# Patient Record
Sex: FEMALE | Race: WHITE | NOT HISPANIC OR LATINO
[De-identification: names, ages, dates, MRNs, and addresses within clinical notes are randomized per-mention and may not be internally consistent; named-entity substitution may affect disease eponyms.]

---

## 2021-09-17 ENCOUNTER — APPOINTMENT (OUTPATIENT)
Dept: OPHTHALMOLOGY | Facility: CLINIC | Age: 76
End: 2021-09-17

## 2024-02-15 ENCOUNTER — RESULT REVIEW (OUTPATIENT)
Age: 79
End: 2024-02-15

## 2024-02-15 PROBLEM — Z00.00 ENCOUNTER FOR PREVENTIVE HEALTH EXAMINATION: Status: ACTIVE | Noted: 2024-02-15

## 2024-02-17 ENCOUNTER — TRANSCRIPTION ENCOUNTER (OUTPATIENT)
Age: 79
End: 2024-02-17

## 2024-02-17 ENCOUNTER — INPATIENT (INPATIENT)
Facility: HOSPITAL | Age: 79
LOS: 4 days | Discharge: HOSPICE | DRG: 64 | End: 2024-02-22
Attending: HOSPITALIST | Admitting: STUDENT IN AN ORGANIZED HEALTH CARE EDUCATION/TRAINING PROGRAM
Payer: MEDICARE

## 2024-02-17 VITALS
OXYGEN SATURATION: 98 % | HEART RATE: 108 BPM | SYSTOLIC BLOOD PRESSURE: 122 MMHG | TEMPERATURE: 100 F | DIASTOLIC BLOOD PRESSURE: 90 MMHG | WEIGHT: 86.42 LBS | RESPIRATION RATE: 18 BRPM

## 2024-02-17 DIAGNOSIS — Z98.890 OTHER SPECIFIED POSTPROCEDURAL STATES: Chronic | ICD-10-CM

## 2024-02-17 DIAGNOSIS — G44 OTHER HEADACHE SYNDROMES: ICD-10-CM

## 2024-02-17 DIAGNOSIS — R09.89 OTHER SPECIFIED SYMPTOMS AND SIGNS INVOLVING THE CIRCULATORY AND RESPIRATORY SYSTEMS: ICD-10-CM

## 2024-02-17 LAB
A1C WITH ESTIMATED AVERAGE GLUCOSE RESULT: 5.6 % — SIGNIFICANT CHANGE UP (ref 4–5.6)
ALBUMIN SERPL ELPH-MCNC: 3 G/DL — LOW (ref 3.3–5.2)
ALP SERPL-CCNC: 56 U/L — SIGNIFICANT CHANGE UP (ref 40–120)
ALT FLD-CCNC: 14 U/L — SIGNIFICANT CHANGE UP
ANION GAP SERPL CALC-SCNC: 14 MMOL/L — SIGNIFICANT CHANGE UP (ref 5–17)
APPEARANCE UR: CLEAR — SIGNIFICANT CHANGE UP
AST SERPL-CCNC: 28 U/L — SIGNIFICANT CHANGE UP
BACTERIA # UR AUTO: NEGATIVE /HPF — SIGNIFICANT CHANGE UP
BILIRUB DIRECT SERPL-MCNC: 0.2 MG/DL — SIGNIFICANT CHANGE UP (ref 0–0.3)
BILIRUB INDIRECT FLD-MCNC: 1 MG/DL — SIGNIFICANT CHANGE UP (ref 0.2–1)
BILIRUB SERPL-MCNC: 1.3 MG/DL — SIGNIFICANT CHANGE UP (ref 0.4–2)
BILIRUB UR-MCNC: NEGATIVE — SIGNIFICANT CHANGE UP
BLD GP AB SCN SERPL QL: SIGNIFICANT CHANGE UP
BUN SERPL-MCNC: 28.5 MG/DL — HIGH (ref 8–20)
CALCIUM SERPL-MCNC: 8.4 MG/DL — SIGNIFICANT CHANGE UP (ref 8.4–10.5)
CAST: 2 /LPF — SIGNIFICANT CHANGE UP (ref 0–4)
CHLORIDE SERPL-SCNC: 104 MMOL/L — SIGNIFICANT CHANGE UP (ref 96–108)
CO2 SERPL-SCNC: 23 MMOL/L — SIGNIFICANT CHANGE UP (ref 22–29)
COLOR SPEC: SIGNIFICANT CHANGE UP
CREAT SERPL-MCNC: 0.48 MG/DL — LOW (ref 0.5–1.3)
DIFF PNL FLD: NEGATIVE — SIGNIFICANT CHANGE UP
EGFR: 96 ML/MIN/1.73M2 — SIGNIFICANT CHANGE UP
ESTIMATED AVERAGE GLUCOSE: 114 MG/DL — SIGNIFICANT CHANGE UP (ref 68–114)
GLUCOSE SERPL-MCNC: 210 MG/DL — HIGH (ref 70–99)
GLUCOSE UR QL: 250 MG/DL
HCT VFR BLD CALC: 27.2 % — LOW (ref 34.5–45)
HGB BLD-MCNC: 9.6 G/DL — LOW (ref 11.5–15.5)
INR BLD: 1.17 RATIO — SIGNIFICANT CHANGE UP (ref 0.85–1.18)
KETONES UR-MCNC: 40 MG/DL
LEUKOCYTE ESTERASE UR-ACNC: ABNORMAL
MCHC RBC-ENTMCNC: 33 PG — SIGNIFICANT CHANGE UP (ref 27–34)
MCHC RBC-ENTMCNC: 35.3 GM/DL — SIGNIFICANT CHANGE UP (ref 32–36)
MCV RBC AUTO: 93.5 FL — SIGNIFICANT CHANGE UP (ref 80–100)
NITRITE UR-MCNC: NEGATIVE — SIGNIFICANT CHANGE UP
PH UR: 6 — SIGNIFICANT CHANGE UP (ref 5–8)
PLATELET # BLD AUTO: 216 K/UL — SIGNIFICANT CHANGE UP (ref 150–400)
POTASSIUM SERPL-MCNC: 3.8 MMOL/L — SIGNIFICANT CHANGE UP (ref 3.5–5.3)
POTASSIUM SERPL-SCNC: 3.8 MMOL/L — SIGNIFICANT CHANGE UP (ref 3.5–5.3)
PROT SERPL-MCNC: 5.5 G/DL — LOW (ref 6.6–8.7)
PROT UR-MCNC: 30 MG/DL
PROTHROM AB SERPL-ACNC: 12.9 SEC — SIGNIFICANT CHANGE UP (ref 9.5–13)
RBC # BLD: 2.91 M/UL — LOW (ref 3.8–5.2)
RBC # FLD: 12.5 % — SIGNIFICANT CHANGE UP (ref 10.3–14.5)
RBC CASTS # UR COMP ASSIST: 6 /HPF — HIGH (ref 0–4)
SODIUM SERPL-SCNC: 141 MMOL/L — SIGNIFICANT CHANGE UP (ref 135–145)
SP GR SPEC: 1.03 — SIGNIFICANT CHANGE UP (ref 1–1.03)
SQUAMOUS # UR AUTO: 3 /HPF — SIGNIFICANT CHANGE UP (ref 0–5)
TSH SERPL-MCNC: 0.9 UIU/ML — SIGNIFICANT CHANGE UP (ref 0.27–4.2)
UROBILINOGEN FLD QL: 1 MG/DL — SIGNIFICANT CHANGE UP (ref 0.2–1)
WBC # BLD: 11.93 K/UL — HIGH (ref 3.8–10.5)
WBC # FLD AUTO: 11.93 K/UL — HIGH (ref 3.8–10.5)
WBC UR QL: 8 /HPF — HIGH (ref 0–5)

## 2024-02-17 PROCEDURE — 71045 X-RAY EXAM CHEST 1 VIEW: CPT | Mod: 26

## 2024-02-17 PROCEDURE — 95720 EEG PHY/QHP EA INCR W/VEEG: CPT

## 2024-02-17 PROCEDURE — 93970 EXTREMITY STUDY: CPT | Mod: 26

## 2024-02-17 PROCEDURE — 99291 CRITICAL CARE FIRST HOUR: CPT

## 2024-02-17 RX ORDER — LEVETIRACETAM 250 MG/1
500 TABLET, FILM COATED ORAL EVERY 12 HOURS
Refills: 0 | Status: DISCONTINUED | OUTPATIENT
Start: 2024-02-17 | End: 2024-02-17

## 2024-02-17 RX ORDER — SENNA PLUS 8.6 MG/1
2 TABLET ORAL AT BEDTIME
Refills: 0 | Status: DISCONTINUED | OUTPATIENT
Start: 2024-02-17 | End: 2024-02-20

## 2024-02-17 RX ORDER — POLYETHYLENE GLYCOL 3350 17 G/17G
17 POWDER, FOR SOLUTION ORAL EVERY 12 HOURS
Refills: 0 | Status: DISCONTINUED | OUTPATIENT
Start: 2024-02-17 | End: 2024-02-20

## 2024-02-17 RX ORDER — HEPARIN SODIUM 5000 [USP'U]/ML
5000 INJECTION INTRAVENOUS; SUBCUTANEOUS EVERY 8 HOURS
Refills: 0 | Status: DISCONTINUED | OUTPATIENT
Start: 2024-02-17 | End: 2024-02-20

## 2024-02-17 RX ORDER — SODIUM CHLORIDE 9 MG/ML
1000 INJECTION, SOLUTION INTRAVENOUS
Refills: 0 | Status: DISCONTINUED | OUTPATIENT
Start: 2024-02-17 | End: 2024-02-19

## 2024-02-17 RX ORDER — ATORVASTATIN CALCIUM 80 MG/1
40 TABLET, FILM COATED ORAL AT BEDTIME
Refills: 0 | Status: DISCONTINUED | OUTPATIENT
Start: 2024-02-17 | End: 2024-02-20

## 2024-02-17 RX ORDER — CHLORHEXIDINE GLUCONATE 213 G/1000ML
15 SOLUTION TOPICAL EVERY 12 HOURS
Refills: 0 | Status: DISCONTINUED | OUTPATIENT
Start: 2024-02-17 | End: 2024-02-18

## 2024-02-17 RX ORDER — PANTOPRAZOLE SODIUM 20 MG/1
40 TABLET, DELAYED RELEASE ORAL DAILY
Refills: 0 | Status: DISCONTINUED | OUTPATIENT
Start: 2024-02-17 | End: 2024-02-22

## 2024-02-17 RX ORDER — LEVETIRACETAM 250 MG/1
500 TABLET, FILM COATED ORAL EVERY 12 HOURS
Refills: 0 | Status: DISCONTINUED | OUTPATIENT
Start: 2024-02-17 | End: 2024-02-18

## 2024-02-17 RX ORDER — ACETAMINOPHEN 500 MG
650 TABLET ORAL EVERY 6 HOURS
Refills: 0 | Status: DISCONTINUED | OUTPATIENT
Start: 2024-02-17 | End: 2024-02-20

## 2024-02-17 RX ORDER — ASPIRIN/CALCIUM CARB/MAGNESIUM 324 MG
81 TABLET ORAL DAILY
Refills: 0 | Status: DISCONTINUED | OUTPATIENT
Start: 2024-02-17 | End: 2024-02-20

## 2024-02-17 RX ADMIN — SODIUM CHLORIDE 75 MILLILITER(S): 9 INJECTION, SOLUTION INTRAVENOUS at 17:41

## 2024-02-17 RX ADMIN — LEVETIRACETAM 400 MILLIGRAM(S): 250 TABLET, FILM COATED ORAL at 17:30

## 2024-02-17 RX ADMIN — PANTOPRAZOLE SODIUM 40 MILLIGRAM(S): 20 TABLET, DELAYED RELEASE ORAL at 22:09

## 2024-02-17 RX ADMIN — Medication 650 MILLIGRAM(S): at 22:15

## 2024-02-17 RX ADMIN — SENNA PLUS 2 TABLET(S): 8.6 TABLET ORAL at 22:08

## 2024-02-17 RX ADMIN — CHLORHEXIDINE GLUCONATE 15 MILLILITER(S): 213 SOLUTION TOPICAL at 17:52

## 2024-02-17 RX ADMIN — POLYETHYLENE GLYCOL 3350 17 GRAM(S): 17 POWDER, FOR SOLUTION ORAL at 17:30

## 2024-02-17 RX ADMIN — ATORVASTATIN CALCIUM 40 MILLIGRAM(S): 80 TABLET, FILM COATED ORAL at 22:07

## 2024-02-17 RX ADMIN — Medication 81 MILLIGRAM(S): at 22:09

## 2024-02-17 RX ADMIN — HEPARIN SODIUM 5000 UNIT(S): 5000 INJECTION INTRAVENOUS; SUBCUTANEOUS at 22:07

## 2024-02-17 NOTE — CHART NOTE - NSCHARTNOTEFT_GEN_A_CORE
78 year old female, otherwise independent and functional with a prior history of stroke 7 years ago, left-sided   craniotomy for an abscess 3 years ago, seizures (on Keppra since 2021) presented the emergency room on   2/13/2024 after having being found on the floor after approx 10 hours. At the time, she was confused, had impaired   speech and reportedly somewhat hypoxic with PO2 of 72. She underwent a full trauma workup with unremarkable   CT brain and CT angiogram of the brain. However she was found to have R IT hip fracture, which was repaired on 2/14.     On 2/15 am she was found to be unresponsive and has since remained unresponsive with episodes of   hypoxia. MRI of the brain which showed extensive small infarcts in watershed and posterior vascular territory   distribution. Palliative care discussed with family and she was initially made DNR/DNI and comfort care.     After review of the case by Dr. Gaitan and Dr. Shipley, it was felt that the infarcts alone were not extensive enough to   cause the depressed level of consciousness. Since patient is at increased risk of seizures due to her previous   insult and with the most recent infarcts as well, it was agreed upon by the multidisciplinary team and family that the   possibility of non-convulsive status should at least be ruled out.     Family revoked comfort care status and opted for DNR with a trial of intubation. Patient was upgraded back to ICU after receiving dose of keppra and versed. Given   her tenuous respiratory status and state of unresponsiveness, patient was intubated for airway protection. She is accepted transfer to [facility] Neuro ICU under Dr. Rubio for VEEG monitoring

## 2024-02-17 NOTE — H&P ADULT - CRITICAL CARE ATTENDING COMMENT
I spent 60 minutes of critical care time examining patient, reviewing vitals, labs, medications, imaging and discussing with the team goals of care to prevent life-threatening in this patient who is at high risk for deterioration or death due to:      Pt seen and examined. Agree with above assessment and plan. I spent 60 minutes of critical care time examining patient, reviewing vitals, labs, medications, imaging and discussing with the team goals of care to prevent life-threatening in this patient who is at high risk for deterioration or death due to:      Pt seen and examined. Agree with above assessment and plan.    GCS 3, intubated  No EO  No FC  0/5 bilat upper  TF bilat lower ext

## 2024-02-17 NOTE — H&P ADULT - HISTORY OF PRESENT ILLNESS
78 year old female, otherwise independent and functional with a prior history of stroke 7 years ago, left-sided   craniotomy for an abscess 3 years ago, seizures (on Keppra since 2021) presented the emergency room on   2/13/2024 after having being found on the floor after approx 10 hours. At the time, she was confused, had impaired   speech and reportedly somewhat hypoxic with PO2 of 72. She underwent a full trauma workup with unremarkable   CT brain and CT angiogram of the brain. However she was found to have R IT hip fracture, which was repaired on 2/14.     On 2/15 am she was found to be unresponsive and has since remained unresponsive with episodes of   hypoxia. MRI of the brain which showed extensive small infarcts in watershed and posterior vascular territory   distribution. Palliative care discussed with family and she was initially made DNR/DNI and comfort care.     After review of the case by Dr. Gaitan and Dr. Shipley, it was felt that the infarcts alone were not extensive enough to   cause the depressed level of consciousness. Since patient is at increased risk of seizures due to her previous   insult and with the most recent infarcts as well, it was agreed upon by the multidisciplinary team and family that the   possibility of non-convulsive status should at least be ruled out.     Family revoked comfort care status and opted for DNR with a trial of intubation. Patient was upgraded back to ICU after receiving dose of keppra and versed. Given   her tenuous respiratory status and state of unresponsiveness, patient was intubated for airway protection. She is accepted transfer to [facility] Neuro ICU for eeg monitoring

## 2024-02-17 NOTE — PATIENT PROFILE ADULT - FALL HARM RISK - HARM RISK INTERVENTIONS

## 2024-02-17 NOTE — H&P ADULT - ASSESSMENT
78 year old female, with prior history of stroke 7 years ago, left-sided craniotomy for an abscess 3 years ago, seizures (on Keppra since 2021) presented the emergency room on   2/13/2024 after having being found on the floor after approx 10 hours.  Found to have hypoxia.  She underwent a full trauma workup with unremarkable   CT brain and CT angiogram of the brain. However she was found to have R IT hip fracture, which was repaired on 2/14.  On 2/15 am she was found to be unresponsive and has since remained unresponsive with episodes of   hypoxia. MRI of the brain which showed extensive small infarcts in watershed and posterior vascular territory  distribution. Palliative care discussed with family and she was initially made DNR/DNI and comfort care. Later reversed to r/o seizure and transferred to [facility] for veeg   - admit to NSCU q 1 neuro checks  - tylenol for pain control  - veeg monitoring  - continue keppra  - intubated: cxr  - infectious work up   - le dopp as high risk for DVT   - atorvastatin for hyperlipidemia

## 2024-02-18 LAB
ANION GAP SERPL CALC-SCNC: 9 MMOL/L — SIGNIFICANT CHANGE UP (ref 5–17)
BUN SERPL-MCNC: 28.9 MG/DL — HIGH (ref 8–20)
CALCIUM SERPL-MCNC: 8.1 MG/DL — LOW (ref 8.4–10.5)
CHLORIDE SERPL-SCNC: 106 MMOL/L — SIGNIFICANT CHANGE UP (ref 96–108)
CO2 SERPL-SCNC: 28 MMOL/L — SIGNIFICANT CHANGE UP (ref 22–29)
CREAT SERPL-MCNC: 0.49 MG/DL — LOW (ref 0.5–1.3)
CULTURE RESULTS: NO GROWTH — SIGNIFICANT CHANGE UP
EGFR: 95 ML/MIN/1.73M2 — SIGNIFICANT CHANGE UP
GLUCOSE SERPL-MCNC: 151 MG/DL — HIGH (ref 70–99)
HCT VFR BLD CALC: 25.3 % — LOW (ref 34.5–45)
HGB BLD-MCNC: 8.7 G/DL — LOW (ref 11.5–15.5)
MAGNESIUM SERPL-MCNC: 2.2 MG/DL — SIGNIFICANT CHANGE UP (ref 1.6–2.6)
MCHC RBC-ENTMCNC: 32.6 PG — SIGNIFICANT CHANGE UP (ref 27–34)
MCHC RBC-ENTMCNC: 34.4 GM/DL — SIGNIFICANT CHANGE UP (ref 32–36)
MCV RBC AUTO: 94.8 FL — SIGNIFICANT CHANGE UP (ref 80–100)
MRSA PCR RESULT.: SIGNIFICANT CHANGE UP
PHOSPHATE SERPL-MCNC: 2.5 MG/DL — SIGNIFICANT CHANGE UP (ref 2.4–4.7)
PLATELET # BLD AUTO: 179 K/UL — SIGNIFICANT CHANGE UP (ref 150–400)
POTASSIUM SERPL-MCNC: 4 MMOL/L — SIGNIFICANT CHANGE UP (ref 3.5–5.3)
POTASSIUM SERPL-SCNC: 4 MMOL/L — SIGNIFICANT CHANGE UP (ref 3.5–5.3)
RBC # BLD: 2.67 M/UL — LOW (ref 3.8–5.2)
RBC # FLD: 12.5 % — SIGNIFICANT CHANGE UP (ref 10.3–14.5)
S AUREUS DNA NOSE QL NAA+PROBE: SIGNIFICANT CHANGE UP
SODIUM SERPL-SCNC: 143 MMOL/L — SIGNIFICANT CHANGE UP (ref 135–145)
SPECIMEN SOURCE: SIGNIFICANT CHANGE UP
WBC # BLD: 9.18 K/UL — SIGNIFICANT CHANGE UP (ref 3.8–10.5)
WBC # FLD AUTO: 9.18 K/UL — SIGNIFICANT CHANGE UP (ref 3.8–10.5)

## 2024-02-18 PROCEDURE — 99291 CRITICAL CARE FIRST HOUR: CPT

## 2024-02-18 PROCEDURE — 99233 SBSQ HOSP IP/OBS HIGH 50: CPT

## 2024-02-18 RX ORDER — LEVETIRACETAM 250 MG/1
1000 TABLET, FILM COATED ORAL EVERY 12 HOURS
Refills: 0 | Status: DISCONTINUED | OUTPATIENT
Start: 2024-02-18 | End: 2024-02-22

## 2024-02-18 RX ORDER — LEVETIRACETAM 250 MG/1
1000 TABLET, FILM COATED ORAL
Refills: 0 | Status: DISCONTINUED | OUTPATIENT
Start: 2024-02-18 | End: 2024-02-18

## 2024-02-18 RX ADMIN — HEPARIN SODIUM 5000 UNIT(S): 5000 INJECTION INTRAVENOUS; SUBCUTANEOUS at 06:09

## 2024-02-18 RX ADMIN — Medication 63.75 MILLIMOLE(S): at 06:22

## 2024-02-18 RX ADMIN — PANTOPRAZOLE SODIUM 40 MILLIGRAM(S): 20 TABLET, DELAYED RELEASE ORAL at 11:03

## 2024-02-18 RX ADMIN — CHLORHEXIDINE GLUCONATE 15 MILLILITER(S): 213 SOLUTION TOPICAL at 06:09

## 2024-02-18 RX ADMIN — LEVETIRACETAM 1000 MILLIGRAM(S): 250 TABLET, FILM COATED ORAL at 17:59

## 2024-02-18 RX ADMIN — POLYETHYLENE GLYCOL 3350 17 GRAM(S): 17 POWDER, FOR SOLUTION ORAL at 06:09

## 2024-02-18 RX ADMIN — LEVETIRACETAM 400 MILLIGRAM(S): 250 TABLET, FILM COATED ORAL at 06:09

## 2024-02-18 RX ADMIN — ATORVASTATIN CALCIUM 40 MILLIGRAM(S): 80 TABLET, FILM COATED ORAL at 21:38

## 2024-02-18 RX ADMIN — HEPARIN SODIUM 5000 UNIT(S): 5000 INJECTION INTRAVENOUS; SUBCUTANEOUS at 14:39

## 2024-02-18 RX ADMIN — SODIUM CHLORIDE 75 MILLILITER(S): 9 INJECTION, SOLUTION INTRAVENOUS at 18:00

## 2024-02-18 RX ADMIN — HEPARIN SODIUM 5000 UNIT(S): 5000 INJECTION INTRAVENOUS; SUBCUTANEOUS at 21:37

## 2024-02-18 RX ADMIN — Medication 81 MILLIGRAM(S): at 11:03

## 2024-02-18 RX ADMIN — SODIUM CHLORIDE 75 MILLILITER(S): 9 INJECTION, SOLUTION INTRAVENOUS at 06:08

## 2024-02-18 NOTE — CHART NOTE - NSCHARTNOTEFT_GEN_A_CORE
78 year old female, otherwise independent and functional with a prior history of stroke 7 years ago, left-sided   craniotomy for an abscess 3 years ago, seizures (on Keppra since 2021) presented the emergency room on   2/13/2024 after having being found on the floor after approx 10 hours. At the time, she was confused, had impaired   speech and reportedly somewhat hypoxic with PO2 of 72. She underwent a full trauma workup with unremarkable   CT brain and CT angiogram of the brain. However she was found to have R IT hip fracture, which was repaired on 2/14.     On 2/15 am she was found to be unresponsive and has since remained unresponsive with episodes of   hypoxia. MRI of the brain which showed extensive small infarcts in watershed and posterior vascular territory   distribution. Palliative care discussed with family and she was initially made DNR/DNI and comfort care.     After review of the case by Dr. Gaitan and Dr. Shipley, it was felt that the infarcts alone were not extensive enough to   cause the depressed level of consciousness. Since patient is at increased risk of seizures due to her previous   insult and with the most recent infarcts as well, it was agreed upon by the multidisciplinary team and family that the   possibility of non-convulsive status should at least be ruled out.     Family revoked comfort care status and opted for DNR with a trial of intubation. Patient was upgraded back to ICU after receiving dose of keppra and versed. Given   her tenuous respiratory status and state of unresponsiveness, patient was intubated for airway protection. She is accepted transfer to [facility] Neuro ICU for eeg monitoring     Hospital Course:  Admitted to NSICU on 2/17 for EEG monitoring.  Overnight EEG negative for seizures.  Patient remains on Keppra 1000mg BID.  Neurology following. Patient extubated on 2/18, tolerating RA.  Family at bedside, presented living will.  Patient DNR/DNI.  GOC to be continued as medically necessary.      Vital Signs Last 24 Hrs  T(C): 37.3 (18 Feb 2024 16:00), Max: 38 (17 Feb 2024 21:00)  T(F): 99.1 (18 Feb 2024 16:00), Max: 100.4 (17 Feb 2024 21:00)  HR: 77 (18 Feb 2024 16:00) (71 - 101)  BP: 131/74 (18 Feb 2024 16:00) (112/77 - 154/82)  BP(mean): 91 (18 Feb 2024 16:00) (88 - 107)  RR: 22 (18 Feb 2024 16:00) (16 - 22)  SpO2: 100% (18 Feb 2024 16:00) (89% - 100%)    Parameters below as of 18 Feb 2024 16:00  Patient On (Oxygen Delivery Method): nasal cannula  O2 Flow (L/min): 3    Physical Exam:  Respiratory: Breath sounds are clear bilaterally  Cardiovascular: S1 and S2, regular rhythm  Gastrointestinal: Soft, NT/ND    Neurological Exam:  OE spontaneously, blinks to threat.  Not FC, non-verbal.  PERRL, +cough/gag/corneals, LUE/LLE spontaneous movement, RUE/RLE trace withdrawal.     Plan:  EEG- no seizures  ASA/statin  keppra 500mg bid  pain mgt: tylenol and oxy 5 prn  SpO2>92% on RA  SBP goal <160  replete lytes prn  lugo  NGT on tube feeds  GI prophylaxis   zofran prn for nausea  euglycemia (-180) 78 year old female, otherwise independent and functional with a prior history of stroke 7 years ago, left-sided   craniotomy for an abscess 3 years ago, seizures (on Keppra since 2021) presented the emergency room on   2/13/2024 after having being found on the floor after approx 10 hours. At the time, she was confused, had impaired   speech and reportedly somewhat hypoxic with PO2 of 72. She underwent a full trauma workup with unremarkable   CT brain and CT angiogram of the brain. However she was found to have R IT hip fracture, which was repaired on 2/14.     On 2/15 am she was found to be unresponsive and has since remained unresponsive with episodes of   hypoxia. MRI of the brain which showed extensive small infarcts in watershed and posterior vascular territory   distribution. Palliative care discussed with family and she was initially made DNR/DNI and comfort care.     After review of the case by Dr. Gaitan and Dr. Shipley, it was felt that the infarcts alone were not extensive enough to   cause the depressed level of consciousness. Since patient is at increased risk of seizures due to her previous   insult and with the most recent infarcts as well, it was agreed upon by the multidisciplinary team and family that the   possibility of non-convulsive status should at least be ruled out.     Family revoked comfort care status and opted for DNR with a trial of intubation. Patient was upgraded back to ICU after receiving dose of keppra and versed. Given   her tenuous respiratory status and state of unresponsiveness, patient was intubated for airway protection. She is accepted transfer to [facility] Neuro ICU for eeg monitoring     Hospital Course:  Admitted to NSICU on 2/17 for EEG monitoring.  Overnight EEG negative for seizures.  Patient remains on Keppra 1000mg BID.  Neurology following. Patient extubated on 2/18, tolerating RA.  Family at bedside, presented living will.  Patient DNR/DNI.  GOC to be continued as medically necessary.      Vital Signs Last 24 Hrs  T(C): 37.3 (18 Feb 2024 16:00), Max: 38 (17 Feb 2024 21:00)  T(F): 99.1 (18 Feb 2024 16:00), Max: 100.4 (17 Feb 2024 21:00)  HR: 77 (18 Feb 2024 16:00) (71 - 101)  BP: 131/74 (18 Feb 2024 16:00) (112/77 - 154/82)  BP(mean): 91 (18 Feb 2024 16:00) (88 - 107)  RR: 22 (18 Feb 2024 16:00) (16 - 22)  SpO2: 100% (18 Feb 2024 16:00) (89% - 100%)    Parameters below as of 18 Feb 2024 16:00  Patient On (Oxygen Delivery Method): nasal cannula  O2 Flow (L/min): 3    Physical Exam:  Respiratory: Breath sounds are clear bilaterally  Cardiovascular: S1 and S2, regular rhythm  Gastrointestinal: Soft, NT/ND    Neurological Exam:  OE spontaneously, blinks to threat.  Not FC, non-verbal.  PERRL, +cough/gag/corneals, LUE/LLE spontaneous movement, RUE/RLE trace withdrawal.     Plan:  EEG- no seizures  ASA/statin  keppra 500mg bid  pain mgt: tylenol and oxy 5 prn  SpO2>92% on RA  SBP goal <160  replete lytes prn  lugo  NGT on tube feeds  GI prophylaxis   zofran prn for nausea  euglycemia (-180)  RT Hip Fixation: WBAT per note 2/16 @ 13:24 OR date 2/14 with Dr. Jammie Coe, [facility]. 78 year old female, otherwise independent and functional with a prior history of stroke 7 years ago, left-sided   craniotomy for an abscess 3 years ago, seizures (on Keppra since 2021) presented the emergency room on   2/13/2024 after having being found on the floor after approx 10 hours. At the time, she was confused, had impaired   speech and reportedly somewhat hypoxic with PO2 of 72. She underwent a full trauma workup with unremarkable   CT brain and CT angiogram of the brain. However she was found to have R IT hip fracture, which was repaired on 2/14.     On 2/15 am she was found to be unresponsive and has since remained unresponsive with episodes of   hypoxia. MRI of the brain which showed extensive small infarcts in watershed and posterior vascular territory   distribution. Palliative care discussed with family and she was initially made DNR/DNI and comfort care.     After review of the case by Dr. Gaitan and Dr. Shipley, it was felt that the infarcts alone were not extensive enough to   cause the depressed level of consciousness. Since patient is at increased risk of seizures due to her previous   insult and with the most recent infarcts as well, it was agreed upon by the multidisciplinary team and family that the   possibility of non-convulsive status should at least be ruled out.     Family revoked comfort care status and opted for DNR with a trial of intubation. Patient was upgraded back to ICU after receiving dose of keppra and versed. Given   her tenuous respiratory status and state of unresponsiveness, patient was intubated for airway protection. She is accepted transfer to [facility] Neuro ICU for eeg monitoring     Hospital Course:  Admitted to NSICU on 2/17 for EEG monitoring.  Overnight EEG negative for seizures.  Patient remains on Keppra 1000mg BID.  Neurology following. Patient extubated on 2/18, tolerating RA.  Family at bedside, presented living will.  Patient DNR/DNI.  GOC to be continued as medically necessary.      Vital Signs Last 24 Hrs  T(C): 37.3 (18 Feb 2024 16:00), Max: 38 (17 Feb 2024 21:00)  T(F): 99.1 (18 Feb 2024 16:00), Max: 100.4 (17 Feb 2024 21:00)  HR: 77 (18 Feb 2024 16:00) (71 - 101)  BP: 131/74 (18 Feb 2024 16:00) (112/77 - 154/82)  BP(mean): 91 (18 Feb 2024 16:00) (88 - 107)  RR: 22 (18 Feb 2024 16:00) (16 - 22)  SpO2: 100% (18 Feb 2024 16:00) (89% - 100%)    Parameters below as of 18 Feb 2024 16:00  Patient On (Oxygen Delivery Method): nasal cannula  O2 Flow (L/min): 3    Physical Exam:  Respiratory: Breath sounds are clear bilaterally  Cardiovascular: S1 and S2, regular rhythm  Gastrointestinal: Soft, NT/ND    Neurological Exam:  OE spontaneously, blinks to threat.  Not FC, non-verbal.  PERRL, +cough/gag/corneals, LUE/LLE spontaneous movement, RUE/RLE trace withdrawal.     Plan:  EEG- no seizures  ASA/statin  keppra 1000mg bid  pain mgt: tylenol and oxy 5 prn  SpO2>92% on RA  SBP goal <160  replete lytes prn  lugo  NGT on tube feeds  GI prophylaxis   zofran prn for nausea  euglycemia (-180)  RT Hip Fixation: WBAT per note 2/16 @ 13:24 OR date 2/14 with Dr. Jammie Coe, [facility].    *Copy of Living Will and MOLST in chart

## 2024-02-18 NOTE — GOALS OF CARE CONVERSATION - ADVANCED CARE PLANNING - TREATMENT GUIDELINE COMMENT
Living will dictates that pt would not want intubation, tube feeds, or CPR. Living will dictates that pt would not want intubation, long term tube feeds, or CPR. (Living will in paper chart)  Family would like to wait a day or two to see how she improves clinically. I explained that she likely will be bedbound and nonverbal. Unclear if she will regain ability to eat. They agreed to temporary tube feeds but agree that she would not want a PEG.

## 2024-02-18 NOTE — PROGRESS NOTE ADULT - ASSESSMENT
ASSESSMENT/PLAN:  78y old  Female who presents with a chief complaint of seizure     NEURO:   SBP<160  q4hr neurochecks  EEG- no seizures  ASA/statin  keppra 500mg bid  pain mgt: tylenol and oxy 5 prn  Activity: [x] mobilize as tolerated [] Bedrest [x] PT [x] OT [] PMNR    PULM: mech vent- wean to extubate  SpO2>92%    CV:  SBP goal <160    RENAL: monitor I/O  replete lytes prn  lugo  Fluids: IVF while NPO    GI:  Diet: NPO  GI prophylaxis   zofran prn for nausea  Bowel regimen [x] senna [] other: miralax    ENDO:   Goal euglycemia (-180)    HEME/ONC:  VTE prophylaxis: [] SCDs [x] chemoprophylaxis     ID: afebrile   no leukocytosis    MISC: GOC pending, possible transfer back to [facility]?    I affirm that this patient is critically ill and at high risk for sudden, fatal deterioration due to one or more of the above stated active issues.     This time does not include bedside procedures that are documented separately.

## 2024-02-18 NOTE — PROGRESS NOTE ADULT - SUBJECTIVE AND OBJECTIVE BOX
Chief complaint:   Patient is a 78y old  Female who presents with a chief complaint of seizure (17 Feb 2024 15:08)    HPI:  78 year old female, otherwise independent and functional with a prior history of stroke 7 years ago, left-sided   craniotomy for an abscess 3 years ago, seizures (on Keppra since 2021) presented the emergency room on   2/13/2024 after having being found on the floor after approx 10 hours. At the time, she was confused, had impaired   speech and reportedly somewhat hypoxic with PO2 of 72. She underwent a full trauma workup with unremarkable   CT brain and CT angiogram of the brain. However she was found to have R IT hip fracture, which was repaired on 2/14.     On 2/15 am she was found to be unresponsive and has since remained unresponsive with episodes of   hypoxia. MRI of the brain which showed extensive small infarcts in watershed and posterior vascular territory   distribution. Palliative care discussed with family and she was initially made DNR/DNI and comfort care.     After review of the case by Dr. Gaitan and Dr. Shipley, it was felt that the infarcts alone were not extensive enough to   cause the depressed level of consciousness. Since patient is at increased risk of seizures due to her previous   insult and with the most recent infarcts as well, it was agreed upon by the multidisciplinary team and family that the   possibility of non-convulsive status should at least be ruled out.     Family revoked comfort care status and opted for DNR with a trial of intubation. Patient was upgraded back to ICU after receiving dose of keppra and versed. Given   her tenuous respiratory status and state of unresponsiveness, patient was intubated for airway protection. She is accepted transfer to [facility] Neuro ICU for eeg monitoring    (17 Feb 2024 15:08)        24hr EVENTS:      ROS: [ ]  Unable to assess due to mental status   All other systems negative    -----------------------------------------------------------------------------------------------------------------------------------------------------------------------------------  ICU Vital Signs Last 24 Hrs  T(C): 37.3 (18 Feb 2024 09:00), Max: 38 (17 Feb 2024 21:00)  T(F): 99.1 (18 Feb 2024 09:00), Max: 100.4 (17 Feb 2024 21:00)  HR: 91 (18 Feb 2024 09:00) (71 - 108)  BP: 134/93 (18 Feb 2024 09:00) (112/77 - 145/86)  BP(mean): 105 (18 Feb 2024 09:00) (88 - 107)  ABP: --  ABP(mean): --  RR: 18 (18 Feb 2024 09:00) (16 - 21)  SpO2: 100% (18 Feb 2024 09:00) (98% - 100%)    O2 Parameters below as of 18 Feb 2024 08:00  Patient On (Oxygen Delivery Method): ventilator    O2 Concentration (%): 30        I&O's Summary    17 Feb 2024 07:01  -  18 Feb 2024 07:00  --------------------------------------------------------  IN: 650 mL / OUT: 150 mL / NET: 500 mL    18 Feb 2024 07:01  -  18 Feb 2024 09:58  --------------------------------------------------------  IN: 150 mL / OUT: 80 mL / NET: 70 mL        MEDICATIONS  (STANDING):  aspirin  chewable 81 milliGRAM(s) Oral daily  atorvastatin 40 milliGRAM(s) Oral at bedtime  chlorhexidine 0.12% Liquid 15 milliLiter(s) Oral Mucosa every 12 hours  heparin   Injectable 5000 Unit(s) SubCutaneous every 8 hours  levETIRAcetam  IVPB 500 milliGRAM(s) IV Intermittent every 12 hours  multiple electrolytes Injection Type 1 1000 milliLiter(s) (75 mL/Hr) IV Continuous <Continuous>  pantoprazole  Injectable 40 milliGRAM(s) IV Push daily  polyethylene glycol 3350 17 Gram(s) Oral every 12 hours  senna 2 Tablet(s) Oral at bedtime      RESPIRATORY:  Mode: CPAP with PS  RR (machine): 16  TV (machine): 350  FiO2: 30  PEEP: 6  PS: 10  MAP: 10  PIP: 16        IMAGING:   Recent imaging studies were reviewed.    LAB RESULTS:                          8.7    9.18  )-----------( 179      ( 18 Feb 2024 03:45 )             25.3       PT/INR - ( 17 Feb 2024 17:20 )   PT: 12.9 sec;   INR: 1.17 ratio             02-18    143  |  106  |  28.9<H>  ----------------------------<  151<H>  4.0   |  28.0  |  0.49<L>    Ca    8.1<L>      18 Feb 2024 03:45  Phos  2.5     02-18  Mg     2.2     02-18    TPro  5.5<L>  /  Alb  3.0<L>  /  TBili  1.3  /  DBili  0.2  /  AST  28  /  ALT  14  /  AlkPhos  56  02-17    -----------------------------------------------------------------------------------------------------------------------------------------------------------------------------------    PHYSICAL EXAM:  General: Calm, intubated  HEENT: MMM  Neuro:  -Mental status- No acute distress, EO spont, not following commands  tracking  -CN- PERRL 3mm, EOMI, tongue midline, face symmetric  R side neglect?  spont LUE and LLE  RUE WD (low tone)  RLE WD    CV: RRR  Pulm: clear to auscultation  Abd: Soft, nontender, nondistended  Ext: no noted edema in lower ext  Skin: warm, dry       Chief complaint:   Patient is a 78y old  Female who presents with a chief complaint of seizure (17 Feb 2024 15:08)    HPI:  78 year old female, otherwise independent and functional with a prior history of stroke 7 years ago, left-sided   craniotomy for an abscess 3 years ago, seizures (on Keppra since 2021) presented the emergency room on   2/13/2024 after having being found on the floor after approx 10 hours. At the time, she was confused, had impaired   speech and reportedly somewhat hypoxic with PO2 of 72. She underwent a full trauma workup with unremarkable   CT brain and CT angiogram of the brain. However she was found to have R IT hip fracture, which was repaired on 2/14.     On 2/15 am she was found to be unresponsive and has since remained unresponsive with episodes of   hypoxia. MRI of the brain which showed extensive small infarcts in watershed and posterior vascular territory   distribution. Palliative care discussed with family and she was initially made DNR/DNI and comfort care.     After review of the case by Dr. Gaitan and Dr. Shipley, it was felt that the infarcts alone were not extensive enough to   cause the depressed level of consciousness. Since patient is at increased risk of seizures due to her previous   insult and with the most recent infarcts as well, it was agreed upon by the multidisciplinary team and family that the   possibility of non-convulsive status should at least be ruled out.     Family revoked comfort care status and opted for DNR with a trial of intubation. Patient was upgraded back to ICU after receiving dose of keppra and versed. Given   her tenuous respiratory status and state of unresponsiveness, patient was intubated for airway protection. She is accepted transfer to [facility] Neuro ICU for eeg monitoring    (17 Feb 2024 15:08)        24hr EVENTS:  no seizures    ROS: [x ]  Unable to assess due to mental status   All other systems negative    -----------------------------------------------------------------------------------------------------------------------------------------------------------------------------------  ICU Vital Signs Last 24 Hrs  T(C): 37.3 (18 Feb 2024 09:00), Max: 38 (17 Feb 2024 21:00)  T(F): 99.1 (18 Feb 2024 09:00), Max: 100.4 (17 Feb 2024 21:00)  HR: 91 (18 Feb 2024 09:00) (71 - 108)  BP: 134/93 (18 Feb 2024 09:00) (112/77 - 145/86)  BP(mean): 105 (18 Feb 2024 09:00) (88 - 107)  ABP: --  ABP(mean): --  RR: 18 (18 Feb 2024 09:00) (16 - 21)  SpO2: 100% (18 Feb 2024 09:00) (98% - 100%)    O2 Parameters below as of 18 Feb 2024 08:00  Patient On (Oxygen Delivery Method): ventilator    O2 Concentration (%): 30        I&O's Summary    17 Feb 2024 07:01  -  18 Feb 2024 07:00  --------------------------------------------------------  IN: 650 mL / OUT: 150 mL / NET: 500 mL    18 Feb 2024 07:01  -  18 Feb 2024 09:58  --------------------------------------------------------  IN: 150 mL / OUT: 80 mL / NET: 70 mL        MEDICATIONS  (STANDING):  aspirin  chewable 81 milliGRAM(s) Oral daily  atorvastatin 40 milliGRAM(s) Oral at bedtime  chlorhexidine 0.12% Liquid 15 milliLiter(s) Oral Mucosa every 12 hours  heparin   Injectable 5000 Unit(s) SubCutaneous every 8 hours  levETIRAcetam  IVPB 500 milliGRAM(s) IV Intermittent every 12 hours  multiple electrolytes Injection Type 1 1000 milliLiter(s) (75 mL/Hr) IV Continuous <Continuous>  pantoprazole  Injectable 40 milliGRAM(s) IV Push daily  polyethylene glycol 3350 17 Gram(s) Oral every 12 hours  senna 2 Tablet(s) Oral at bedtime      RESPIRATORY:  Mode: CPAP with PS  RR (machine): 16  TV (machine): 350  FiO2: 30  PEEP: 6  PS: 10  MAP: 10  PIP: 16        IMAGING:   Recent imaging studies were reviewed.    LAB RESULTS:                          8.7    9.18  )-----------( 179      ( 18 Feb 2024 03:45 )             25.3       PT/INR - ( 17 Feb 2024 17:20 )   PT: 12.9 sec;   INR: 1.17 ratio             02-18    143  |  106  |  28.9<H>  ----------------------------<  151<H>  4.0   |  28.0  |  0.49<L>    Ca    8.1<L>      18 Feb 2024 03:45  Phos  2.5     02-18  Mg     2.2     02-18    TPro  5.5<L>  /  Alb  3.0<L>  /  TBili  1.3  /  DBili  0.2  /  AST  28  /  ALT  14  /  AlkPhos  56  02-17    -----------------------------------------------------------------------------------------------------------------------------------------------------------------------------------    PHYSICAL EXAM:  General: Calm, intubated  HEENT: MMM  Neuro:  -Mental status- No acute distress, EO spont, not following commands  tracking  -CN- PERRL 3mm, EOMI, tongue midline, face symmetric  R side neglect?  spont LUE and LLE  RUE WD (low tone)  RLE WD    CV: RRR  Pulm: clear to auscultation  Abd: Soft, nontender, nondistended  Ext: no noted edema in lower ext  Skin: warm, dry

## 2024-02-18 NOTE — PROGRESS NOTE ADULT - ASSESSMENT
78y/oF PMH CVA (7yrs ago), L-sided craniotomy for abscess 3 yrs ago, hx seizures (on Keppra since 2021) presenting to [facility] 2/13 after found down for ~10hrs. Pt with reported confusion, impaired speech and hypoxia to spO2 72. CTH and CTA head, neck, perfusion without acute findings. Found to have R IT hip fx, repaired 2/14. on 2/15, pt found to be unresponsive with intermittent hypoxia. MRI brain with extensive small infarcts in watershed and posterior vascular territory distribution. Seen by Palliative care, initially transitioned to comfort care and DNR/DNI code status. After review of the care by Dr. Gaitan and Dr. Shipley, it was felt that the infarcts alone were not extensive enough to cause the depressed level of consciousness and that non-convulsive status should be ruled out. Family revoked comfort care status, pt upgraded back to ICU after receiving dose Keppra and Versed. Pt intubated for airway protection. Transferred to [facility] 2/17 under NSICU care for q1h neurochecks and r/o seizure. Pt continued on Keppra 1000mg BID. cvEEG without seizures. Extubated 2/18 to NC. Mental status/neuro exam improving. Downgraded to floor 2/18.       metabolic encephalopathy 2/2   acute CVAs   status epilepticus ruled out   hx seizure disorder   rule out infection  -s/p NSICU   -CTH, CTA head/neck reviewed (HIE)   -MRI brain 2/15 reviewed (HIE): numerous areas acute infarcts seen throughout posterior fossa and supratentorial regions   -cvEEG without seizures   -cont asa, statin   -extubated 2/18 (intubated prior to transfer for airway protection)   -fall precautions  -seizure precautions   -aspiration precautions   -cont neurochecks   -f/u neuro   -cont Keppra via ngt   -hgba1c 5.6   -f/u lipids   -f/u cultures   -CXR reviewed, pending official read   -UA reviewed   -f/u SLP     R hip fx   -s/p R hip fixation (2/14) at [facility]   -as per ortho note WBAT   -f/u PT/OT     Anemia  suspect some postop blood loss   -f/u am cbc, am labs   -monitor for s/sx active bleeding     vte ppx: heparin sq     MOLST and living will in chart     Family updated at bedside  78y/oF PMH CVA (7yrs ago), L-sided craniotomy for abscess 3 yrs ago, hx seizures (on Keppra since 2021) presenting to [facility] ER 2/13 after found down for ~10hrs. Pt with reported confusion, impaired speech and hypoxia to spO2 72. CTH and CTA head, neck, perfusion without acute findings. Found to have R IT hip fx, repaired 2/14. on 2/15, pt found to be unresponsive with intermittent hypoxia. MRI brain with extensive small infarcts in watershed and posterior vascular territory distribution. Seen by Palliative care, initially transitioned to comfort care and DNR/DNI code status. After review of the care by Dr. Gaitan and Dr. Shipley, it was felt that the infarcts alone were not extensive enough to cause the depressed level of consciousness and that non-convulsive status should be ruled out. Family revoked comfort care status, pt upgraded back to ICU after receiving dose Keppra and Versed. Pt intubated for airway protection. Transferred to [facility] 2/17 under NSICU care for q1h neurochecks and r/o seizure. Pt continued on Keppra 1000mg BID. cvEEG without seizures. Extubated 2/18 to NC. Mental status/neuro exam improving. Downgraded to floor 2/18.       metabolic encephalopathy 2/2   acute CVAs   status epilepticus ruled out   hx seizure disorder   rule out infection  -s/p NSICU   -CTH, CTA head/neck reviewed (HIE)   -MRI brain 2/15 reviewed (HIE): numerous areas acute infarcts seen throughout posterior fossa and supratentorial regions   -cvEEG without seizures   -cont asa, statin   -extubated 2/18 (intubated prior to transfer for airway protection)   -fall precautions  -seizure precautions   -aspiration precautions   -cont neurochecks   -f/u neuro   -cont Keppra via ngt   -hgba1c 5.6   -f/u lipids   -f/u cultures   -CXR reviewed, pending official read   -UA reviewed   -f/u SLP     R hip fx   -s/p R hip fixation (2/14) at [facility]   -as per ortho note WBAT   -f/u PT/OT     Anemia  suspect some postop blood loss   -f/u am cbc, am labs   -monitor for s/sx active bleeding     vte ppx: heparin sq     MOLST and living will in chart     Family updated at bedside

## 2024-02-18 NOTE — PROGRESS NOTE ADULT - CRITICAL CARE ATTENDING COMMENT
I spent 60 minutes of critical care time examining patient, reviewing vitals, labs, medications, imaging and discussing with the team goals of care to prevent life-threatening in this patient who is at high risk for deterioration or death due to:  strokes
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
## 2024-02-18 NOTE — EEG REPORT - NS EEG TEXT BOX
LULY LUZ [id number]     Study Date: 		02-17-24 to 02-18-24  Duration x Hours: 15h 36 m  --------------------------------------------------------------------------------------------------  History:  CC/ HPI Patient is a 78y old  Female who presents with a chief complaint of seizure (17 Feb 2024 15:08)    MEDICATIONS  (STANDING):  aspirin  chewable 81 milliGRAM(s) Oral daily  atorvastatin 40 milliGRAM(s) Oral at bedtime  chlorhexidine 0.12% Liquid 15 milliLiter(s) Oral Mucosa every 12 hours  heparin   Injectable 5000 Unit(s) SubCutaneous every 8 hours  levETIRAcetam  IVPB 500 milliGRAM(s) IV Intermittent every 12 hours  multiple electrolytes Injection Type 1 1000 milliLiter(s) (75 mL/Hr) IV Continuous <Continuous>  pantoprazole  Injectable 40 milliGRAM(s) IV Push daily  polyethylene glycol 3350 17 Gram(s) Oral every 12 hours  senna 2 Tablet(s) Oral at bedtime    --------------------------------------------------------------------------------------------------  Study Interpretation:    [[[Abbreviation Key:  PDR=alpha rhythm/posterior dominant rhythm. A-P=anterior posterior.  Amplitude: ‘very low’:<20; ‘low’:20-49; ‘medium’:; ‘high’:>150uV.  Persistence for periodic/rhythmic patterns (% of epoch) ‘rare’:<1%; ‘occasional’:1-10%; ‘frequent’:10-50%; ‘abundant’:50-90%; ‘continuous’:>90%.  Persistence for sporadic discharges: ‘rare’:<1/hr; ‘occasional’:1/min-1/hr; ‘frequent’:>1/min; ‘abundant’:>1/10 sec.  RPP=rhythmic and periodic patterns; GRDA=generalized rhythmic delta activity; FIRDA=frontal intermittent GRDA; LRDA=lateralized rhythmic delta activity; TIRDA=temporal intermittent rhythmic delta activity;  LPD=PLED=lateralized periodic discharges; GPD=generalized periodic discharges; BIPDs =bilateral independent periodic discharges; Mf=multifocal; SIRPDs=stimulus induced rhythmic, periodic, or ictal appearing discharges; BIRDs=brief potentially ictal rhythmic discharges >4 Hz, lasting .5-10s; PFA (paroxysmal bursts >13 Hz or =8 Hz <10s).  Modifiers: +F=with fast component; +S=with spike component; +R=with rhythmic component.  S-B=burst suppression pattern.  Max=maximal. N1-drowsy; N2-stage II sleep; N3-slow wave sleep. SSS/BETS=small sharp spikes/benign epileptiform transients of sleep. HV=hyperventilation; PS=photic stimulation]]]    Daily EEG Visual Analysis    FINDINGS:      Background:  Continuity: continuous  Symmetry: asymmetric  PDR: none  Reactivity: present  Voltage: normal  Anterior Posterior Gradient: absent  Other background findings:   Breach: High amplitude, sharply contoured activity, intermixed with faster frequencies over the left temporal region.    Background Slowing:  Generalized slowing: Diffuse polymorphic delta/theta activity  Focal slowing: continuous focal polymorphic delta/theta activity in the left temporal region.    State Changes:   -Drowsiness noted with further slowing  -Present with N2 sleep transients with symmetric spindles and K-complexes.    Sporadic Epileptiform Discharges:    Occasional left temporal sharp waves T7/P7 maximum.   Occasional bifrontal sharp waves (Fp1 > Fp2)    Rhythmic and Periodic Patterns (RPPs):  Intermittent GPDs with triphasic morphology noted at 1 to 1.5 hz, reaching upto 2hz.    Electrographic and Electroclinical seizures:  None    Other Clinical Events:  None    Activation Procedures:   -Hyperventilation was not performed.    -Photic stimulation was not performed.    Artifacts:  Intermittent myogenic and movement artifacts were noted.    ECG:  Single channel EKG shows irregularly irregular rhythm      EEG Classification / Summary:  Abnormal  EEG   Occasional left temporal sharp waves T7/P7 maximum.   Occasional bifrontal sharp waves (Fp1 > Fp2)  Left temporal focal slowing and Breach artifact  GPDs with triphasic morphology   Moderate diffuse background slowing.     Clinical Impression:  Risk for focal seizures arising from the left temporal region and bifrontal regions.  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   GPDs with triphasic morphology indicate severe metabolic encephalopathy, however in certain clinical scenarios GPDs may have slightly elevated risk for seizures.  No seizures recorded.     This is fellow preliminary read, pending attending review.  -------------------------------------------------------------------------------------------------------  [facility] EEG Reading Room Ph#: [phone]  Epilepsy Answering Service after 5PM and before 8:30AM: Ph#: [phone]    ANGIE Marinelli  Epilepsy Fellow   LULY LUZ [id number]     Study Date: 		02-17-24 to 02-18-24  Duration x Hours: 15h 36 m  --------------------------------------------------------------------------------------------------  History:  CC/ HPI Patient is a 78y old  Female who presents with a chief complaint of seizure (17 Feb 2024 15:08)    MEDICATIONS  (STANDING):  aspirin  chewable 81 milliGRAM(s) Oral daily  atorvastatin 40 milliGRAM(s) Oral at bedtime  chlorhexidine 0.12% Liquid 15 milliLiter(s) Oral Mucosa every 12 hours  heparin   Injectable 5000 Unit(s) SubCutaneous every 8 hours  levETIRAcetam  IVPB 500 milliGRAM(s) IV Intermittent every 12 hours  multiple electrolytes Injection Type 1 1000 milliLiter(s) (75 mL/Hr) IV Continuous <Continuous>  pantoprazole  Injectable 40 milliGRAM(s) IV Push daily  polyethylene glycol 3350 17 Gram(s) Oral every 12 hours  senna 2 Tablet(s) Oral at bedtime    --------------------------------------------------------------------------------------------------  Study Interpretation:    [[[Abbreviation Key:  PDR=alpha rhythm/posterior dominant rhythm. A-P=anterior posterior.  Amplitude: ‘very low’:<20; ‘low’:20-49; ‘medium’:; ‘high’:>150uV.  Persistence for periodic/rhythmic patterns (% of epoch) ‘rare’:<1%; ‘occasional’:1-10%; ‘frequent’:10-50%; ‘abundant’:50-90%; ‘continuous’:>90%.  Persistence for sporadic discharges: ‘rare’:<1/hr; ‘occasional’:1/min-1/hr; ‘frequent’:>1/min; ‘abundant’:>1/10 sec.  RPP=rhythmic and periodic patterns; GRDA=generalized rhythmic delta activity; FIRDA=frontal intermittent GRDA; LRDA=lateralized rhythmic delta activity; TIRDA=temporal intermittent rhythmic delta activity;  LPD=PLED=lateralized periodic discharges; GPD=generalized periodic discharges; BIPDs =bilateral independent periodic discharges; Mf=multifocal; SIRPDs=stimulus induced rhythmic, periodic, or ictal appearing discharges; BIRDs=brief potentially ictal rhythmic discharges >4 Hz, lasting .5-10s; PFA (paroxysmal bursts >13 Hz or =8 Hz <10s).  Modifiers: +F=with fast component; +S=with spike component; +R=with rhythmic component.  S-B=burst suppression pattern.  Max=maximal. N1-drowsy; N2-stage II sleep; N3-slow wave sleep. SSS/BETS=small sharp spikes/benign epileptiform transients of sleep. HV=hyperventilation; PS=photic stimulation]]]    Daily EEG Visual Analysis    FINDINGS:      Background:  Continuity: continuous  Symmetry: asymmetric  PDR: none  Reactivity: present  Voltage: normal  Anterior Posterior Gradient: absent  Other background findings:   Breach: High amplitude, sharply contoured activity, intermixed with faster frequencies over the left temporal region.    Background Slowing:  Generalized slowing: Diffuse polymorphic delta/theta activity  Focal slowing: continuous focal polymorphic delta/theta activity in the left temporal region.    State Changes:   -Drowsiness noted with further slowing  -Present with N2 sleep transients with symmetric spindles and K-complexes.    Sporadic Epileptiform Discharges:    Occasional left temporal sharp waves T7/P7 maximum.   Occasional bifrontal sharp waves (Fp1 > Fp2)    Rhythmic and Periodic Patterns (RPPs):  Intermittent GPDs with triphasic morphology noted at 1 to 1.5 hz, reaching upto 2hz.    Electrographic and Electroclinical seizures:  None    Other Clinical Events:  None    Activation Procedures:   -Hyperventilation was not performed.    -Photic stimulation was not performed.    Artifacts:  Intermittent myogenic and movement artifacts were noted.    ECG:  Single channel EKG shows irregularly irregular rhythm      EEG Classification / Summary:  Abnormal  EEG   Occasional left temporal sharp waves T7/P7 maximum.   Occasional bifrontal sharp waves (Fp1 > Fp2)  Left temporal focal slowing and Breach artifact  GPDs with triphasic morphology   Moderate diffuse background slowing.     Clinical Impression:  Risk for focal seizures arising from the left temporal region and bifrontal regions.  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   GPDs with triphasic morphology indicate severe metabolic encephalopathy, however in certain clinical scenarios GPDs may have slightly elevated risk for seizures.  No seizures recorded.     -------------------------------------------------------------------------------------------------------  [facility] EEG Reading Room Ph#: [phone]  Epilepsy Answering Service after 5PM and before 8:30AM: Ph#: [phone]    ANGIE Marinelli  Epilepsy Fellow    Michael Santana MD  Neurology Attending Physician   LULY LUZ [id number]     Study Date: 		02-17-24 to 02-18-24  Duration x Hours: 22h 36 m  --------------------------------------------------------------------------------------------------  History:  CC/ HPI Patient is a 78y old  Female who presents with a chief complaint of seizure (17 Feb 2024 15:08)    MEDICATIONS  (STANDING):  aspirin  chewable 81 milliGRAM(s) Oral daily  atorvastatin 40 milliGRAM(s) Oral at bedtime  chlorhexidine 0.12% Liquid 15 milliLiter(s) Oral Mucosa every 12 hours  heparin   Injectable 5000 Unit(s) SubCutaneous every 8 hours  levETIRAcetam  IVPB 500 milliGRAM(s) IV Intermittent every 12 hours  multiple electrolytes Injection Type 1 1000 milliLiter(s) (75 mL/Hr) IV Continuous <Continuous>  pantoprazole  Injectable 40 milliGRAM(s) IV Push daily  polyethylene glycol 3350 17 Gram(s) Oral every 12 hours  senna 2 Tablet(s) Oral at bedtime    --------------------------------------------------------------------------------------------------  Study Interpretation:    [[[Abbreviation Key:  PDR=alpha rhythm/posterior dominant rhythm. A-P=anterior posterior.  Amplitude: ‘very low’:<20; ‘low’:20-49; ‘medium’:; ‘high’:>150uV.  Persistence for periodic/rhythmic patterns (% of epoch) ‘rare’:<1%; ‘occasional’:1-10%; ‘frequent’:10-50%; ‘abundant’:50-90%; ‘continuous’:>90%.  Persistence for sporadic discharges: ‘rare’:<1/hr; ‘occasional’:1/min-1/hr; ‘frequent’:>1/min; ‘abundant’:>1/10 sec.  RPP=rhythmic and periodic patterns; GRDA=generalized rhythmic delta activity; FIRDA=frontal intermittent GRDA; LRDA=lateralized rhythmic delta activity; TIRDA=temporal intermittent rhythmic delta activity;  LPD=PLED=lateralized periodic discharges; GPD=generalized periodic discharges; BIPDs =bilateral independent periodic discharges; Mf=multifocal; SIRPDs=stimulus induced rhythmic, periodic, or ictal appearing discharges; BIRDs=brief potentially ictal rhythmic discharges >4 Hz, lasting .5-10s; PFA (paroxysmal bursts >13 Hz or =8 Hz <10s).  Modifiers: +F=with fast component; +S=with spike component; +R=with rhythmic component.  S-B=burst suppression pattern.  Max=maximal. N1-drowsy; N2-stage II sleep; N3-slow wave sleep. SSS/BETS=small sharp spikes/benign epileptiform transients of sleep. HV=hyperventilation; PS=photic stimulation]]]    Daily EEG Visual Analysis    FINDINGS:      Background:  Continuity: continuous  Symmetry: asymmetric  PDR: none  Reactivity: present  Voltage: normal  Anterior Posterior Gradient: absent  Other background findings:   Breach: High amplitude, sharply contoured activity, intermixed with faster frequencies over the left temporal region.    Background Slowing:  Generalized slowing: Diffuse polymorphic delta/theta activity  Focal slowing: continuous focal polymorphic delta/theta activity in the left temporal region.    State Changes:   -Drowsiness noted with further slowing  -Present with N2 sleep transients with symmetric spindles and K-complexes.    Sporadic Epileptiform Discharges:    Occasional left temporal sharp waves T7/P7 maximum.   Occasional bifrontal sharp waves (Fp1 > Fp2)    Rhythmic and Periodic Patterns (RPPs):  Intermittent GPDs with triphasic morphology noted at 1 to 1.5 hz, reaching upto 2hz.    Electrographic and Electroclinical seizures:  None    Other Clinical Events:  None    Activation Procedures:   -Hyperventilation was not performed.    -Photic stimulation was not performed.    Artifacts:  Intermittent myogenic and movement artifacts were noted.    ECG:  Single channel EKG shows irregularly irregular rhythm      EEG Classification / Summary:  Abnormal  EEG   Occasional left temporal sharp waves T7/P7 maximum.   Occasional bifrontal sharp waves (Fp1 > Fp2)  Left temporal focal slowing and Breach artifact  GPDs with triphasic morphology   Moderate diffuse background slowing.     Clinical Impression:  Risk for focal seizures arising from the left temporal region and bifrontal regions.  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   GPDs with triphasic morphology indicate severe metabolic encephalopathy, however in certain clinical scenarios GPDs may have slightly elevated risk for seizures.  No seizures recorded.     -------------------------------------------------------------------------------------------------------  [facility] EEG Reading Room Ph#: [phone]  Epilepsy Answering Service after 5PM and before 8:30AM: Ph#: [phone]    ANGIE Marinelli  Epilepsy Fellow    Michael Santana MD  Neurology Attending Physician

## 2024-02-19 LAB
ALBUMIN SERPL ELPH-MCNC: 2.9 G/DL — LOW (ref 3.3–5.2)
ALP SERPL-CCNC: 56 U/L — SIGNIFICANT CHANGE UP (ref 40–120)
ALT FLD-CCNC: 14 U/L — SIGNIFICANT CHANGE UP
ANION GAP SERPL CALC-SCNC: 15 MMOL/L — SIGNIFICANT CHANGE UP (ref 5–17)
AST SERPL-CCNC: 29 U/L — SIGNIFICANT CHANGE UP
BASOPHILS # BLD AUTO: 0.01 K/UL — SIGNIFICANT CHANGE UP (ref 0–0.2)
BASOPHILS NFR BLD AUTO: 0.1 % — SIGNIFICANT CHANGE UP (ref 0–2)
BILIRUB DIRECT SERPL-MCNC: 0.3 MG/DL — SIGNIFICANT CHANGE UP (ref 0–0.3)
BILIRUB INDIRECT FLD-MCNC: 1.5 MG/DL — HIGH (ref 0.2–1)
BILIRUB SERPL-MCNC: 1.8 MG/DL — SIGNIFICANT CHANGE UP (ref 0.4–2)
BUN SERPL-MCNC: 16.3 MG/DL — SIGNIFICANT CHANGE UP (ref 8–20)
CALCIUM SERPL-MCNC: 8 MG/DL — LOW (ref 8.4–10.5)
CHLORIDE SERPL-SCNC: 102 MMOL/L — SIGNIFICANT CHANGE UP (ref 96–108)
CHOLEST SERPL-MCNC: 105 MG/DL — SIGNIFICANT CHANGE UP
CO2 SERPL-SCNC: 26 MMOL/L — SIGNIFICANT CHANGE UP (ref 22–29)
CREAT SERPL-MCNC: 0.46 MG/DL — LOW (ref 0.5–1.3)
EGFR: 97 ML/MIN/1.73M2 — SIGNIFICANT CHANGE UP
EOSINOPHIL # BLD AUTO: 0.02 K/UL — SIGNIFICANT CHANGE UP (ref 0–0.5)
EOSINOPHIL NFR BLD AUTO: 0.2 % — SIGNIFICANT CHANGE UP (ref 0–6)
FERRITIN SERPL-MCNC: 252 NG/ML — SIGNIFICANT CHANGE UP (ref 13–330)
FOLATE SERPL-MCNC: 14.4 NG/ML — SIGNIFICANT CHANGE UP
GLUCOSE SERPL-MCNC: 148 MG/DL — HIGH (ref 70–99)
HCT VFR BLD CALC: 25.8 % — LOW (ref 34.5–45)
HDLC SERPL-MCNC: 37 MG/DL — LOW
HGB BLD-MCNC: 9 G/DL — LOW (ref 11.5–15.5)
IMM GRANULOCYTES NFR BLD AUTO: 0.6 % — SIGNIFICANT CHANGE UP (ref 0–0.9)
IRON SATN MFR SERPL: 19 % — SIGNIFICANT CHANGE UP (ref 14–50)
IRON SATN MFR SERPL: 33 UG/DL — LOW (ref 37–145)
LIPID PNL WITH DIRECT LDL SERPL: 46 MG/DL — SIGNIFICANT CHANGE UP
LYMPHOCYTES # BLD AUTO: 0.97 K/UL — LOW (ref 1–3.3)
LYMPHOCYTES # BLD AUTO: 11.5 % — LOW (ref 13–44)
MAGNESIUM SERPL-MCNC: 2 MG/DL — SIGNIFICANT CHANGE UP (ref 1.6–2.6)
MCHC RBC-ENTMCNC: 33.2 PG — SIGNIFICANT CHANGE UP (ref 27–34)
MCHC RBC-ENTMCNC: 34.9 GM/DL — SIGNIFICANT CHANGE UP (ref 32–36)
MCV RBC AUTO: 95.2 FL — SIGNIFICANT CHANGE UP (ref 80–100)
MONOCYTES # BLD AUTO: 0.62 K/UL — SIGNIFICANT CHANGE UP (ref 0–0.9)
MONOCYTES NFR BLD AUTO: 7.4 % — SIGNIFICANT CHANGE UP (ref 2–14)
NEUTROPHILS # BLD AUTO: 6.75 K/UL — SIGNIFICANT CHANGE UP (ref 1.8–7.4)
NEUTROPHILS NFR BLD AUTO: 80.2 % — HIGH (ref 43–77)
NON HDL CHOLESTEROL: 68 MG/DL — SIGNIFICANT CHANGE UP
PHOSPHATE SERPL-MCNC: 3.3 MG/DL — SIGNIFICANT CHANGE UP (ref 2.4–4.7)
PLATELET # BLD AUTO: 211 K/UL — SIGNIFICANT CHANGE UP (ref 150–400)
POTASSIUM SERPL-MCNC: 3.1 MMOL/L — LOW (ref 3.5–5.3)
POTASSIUM SERPL-SCNC: 3.1 MMOL/L — LOW (ref 3.5–5.3)
PROT SERPL-MCNC: 5.2 G/DL — LOW (ref 6.6–8.7)
RBC # BLD: 2.71 M/UL — LOW (ref 3.8–5.2)
RBC # BLD: 2.71 M/UL — LOW (ref 3.8–5.2)
RBC # FLD: 12.4 % — SIGNIFICANT CHANGE UP (ref 10.3–14.5)
RETICS #: 95.4 K/UL — SIGNIFICANT CHANGE UP (ref 25–125)
RETICS/RBC NFR: 3.5 % — HIGH (ref 0.5–2.5)
SODIUM SERPL-SCNC: 143 MMOL/L — SIGNIFICANT CHANGE UP (ref 135–145)
TIBC SERPL-MCNC: 174 UG/DL — LOW (ref 220–430)
TRANSFERRIN SERPL-MCNC: 122 MG/DL — LOW (ref 192–382)
TRIGL SERPL-MCNC: 111 MG/DL — SIGNIFICANT CHANGE UP
VIT B12 SERPL-MCNC: 1128 PG/ML — SIGNIFICANT CHANGE UP (ref 232–1245)
WBC # BLD: 8.42 K/UL — SIGNIFICANT CHANGE UP (ref 3.8–10.5)
WBC # FLD AUTO: 8.42 K/UL — SIGNIFICANT CHANGE UP (ref 3.8–10.5)

## 2024-02-19 PROCEDURE — 99222 1ST HOSP IP/OBS MODERATE 55: CPT

## 2024-02-19 PROCEDURE — 99233 SBSQ HOSP IP/OBS HIGH 50: CPT

## 2024-02-19 PROCEDURE — 99497 ADVNCD CARE PLAN 30 MIN: CPT | Mod: 25

## 2024-02-19 PROCEDURE — 71045 X-RAY EXAM CHEST 1 VIEW: CPT | Mod: 26

## 2024-02-19 PROCEDURE — 99223 1ST HOSP IP/OBS HIGH 75: CPT

## 2024-02-19 RX ORDER — PIPERACILLIN AND TAZOBACTAM 4; .5 G/20ML; G/20ML
3.38 INJECTION, POWDER, LYOPHILIZED, FOR SOLUTION INTRAVENOUS EVERY 8 HOURS
Refills: 0 | Status: DISCONTINUED | OUTPATIENT
Start: 2024-02-19 | End: 2024-02-20

## 2024-02-19 RX ORDER — POTASSIUM CHLORIDE 20 MEQ
10 PACKET (EA) ORAL
Refills: 0 | Status: COMPLETED | OUTPATIENT
Start: 2024-02-19 | End: 2024-02-19

## 2024-02-19 RX ORDER — FUROSEMIDE 40 MG
40 TABLET ORAL ONCE
Refills: 0 | Status: COMPLETED | OUTPATIENT
Start: 2024-02-19 | End: 2024-02-19

## 2024-02-19 RX ORDER — PIPERACILLIN AND TAZOBACTAM 4; .5 G/20ML; G/20ML
3.38 INJECTION, POWDER, LYOPHILIZED, FOR SOLUTION INTRAVENOUS ONCE
Refills: 0 | Status: COMPLETED | OUTPATIENT
Start: 2024-02-19 | End: 2024-02-19

## 2024-02-19 RX ORDER — POTASSIUM CHLORIDE 20 MEQ
10 PACKET (EA) ORAL
Refills: 0 | Status: DISCONTINUED | OUTPATIENT
Start: 2024-02-19 | End: 2024-02-19

## 2024-02-19 RX ADMIN — PIPERACILLIN AND TAZOBACTAM 25 GRAM(S): 4; .5 INJECTION, POWDER, LYOPHILIZED, FOR SOLUTION INTRAVENOUS at 21:01

## 2024-02-19 RX ADMIN — Medication 100 MILLIEQUIVALENT(S): at 18:49

## 2024-02-19 RX ADMIN — PANTOPRAZOLE SODIUM 40 MILLIGRAM(S): 20 TABLET, DELAYED RELEASE ORAL at 11:42

## 2024-02-19 RX ADMIN — HEPARIN SODIUM 5000 UNIT(S): 5000 INJECTION INTRAVENOUS; SUBCUTANEOUS at 05:57

## 2024-02-19 RX ADMIN — Medication 40 MILLIGRAM(S): at 02:37

## 2024-02-19 RX ADMIN — Medication 650 MILLIGRAM(S): at 06:13

## 2024-02-19 RX ADMIN — ATORVASTATIN CALCIUM 40 MILLIGRAM(S): 80 TABLET, FILM COATED ORAL at 21:02

## 2024-02-19 RX ADMIN — PIPERACILLIN AND TAZOBACTAM 200 GRAM(S): 4; .5 INJECTION, POWDER, LYOPHILIZED, FOR SOLUTION INTRAVENOUS at 02:39

## 2024-02-19 RX ADMIN — Medication 100 MILLIEQUIVALENT(S): at 17:35

## 2024-02-19 RX ADMIN — Medication 100 MILLIEQUIVALENT(S): at 16:34

## 2024-02-19 RX ADMIN — Medication 100 MILLIEQUIVALENT(S): at 09:48

## 2024-02-19 RX ADMIN — LEVETIRACETAM 1000 MILLIGRAM(S): 250 TABLET, FILM COATED ORAL at 17:47

## 2024-02-19 RX ADMIN — PIPERACILLIN AND TAZOBACTAM 25 GRAM(S): 4; .5 INJECTION, POWDER, LYOPHILIZED, FOR SOLUTION INTRAVENOUS at 15:05

## 2024-02-19 RX ADMIN — LEVETIRACETAM 1000 MILLIGRAM(S): 250 TABLET, FILM COATED ORAL at 05:57

## 2024-02-19 RX ADMIN — HEPARIN SODIUM 5000 UNIT(S): 5000 INJECTION INTRAVENOUS; SUBCUTANEOUS at 15:05

## 2024-02-19 RX ADMIN — HEPARIN SODIUM 5000 UNIT(S): 5000 INJECTION INTRAVENOUS; SUBCUTANEOUS at 21:01

## 2024-02-19 RX ADMIN — Medication 650 MILLIGRAM(S): at 05:57

## 2024-02-19 RX ADMIN — Medication 81 MILLIGRAM(S): at 16:34

## 2024-02-19 RX ADMIN — PIPERACILLIN AND TAZOBACTAM 25 GRAM(S): 4; .5 INJECTION, POWDER, LYOPHILIZED, FOR SOLUTION INTRAVENOUS at 05:57

## 2024-02-19 NOTE — SWALLOW BEDSIDE ASSESSMENT ADULT - SLP GENERAL OBSERVATIONS
Pt received awake in bed, lethargic, nonverbal, able to close lips upon command but no other command following, +02NC, +NG tube, nonverbal pain scale 0

## 2024-02-19 NOTE — CHART NOTE - NSCHARTNOTEFT_GEN_A_CORE
78y/oF PMH CVA (7yrs ago), L-sided craniotomy for abscess 3 yrs ago, hx seizures (on Keppra since 2021) admitted with metabolic encephalopathy 2/2 acute CVAs, Pt intubated for airway protection and extubated 2/18.   Patient started on TF tonight at around 9:30 PM  Patient now with rhonchorous breath sounds and tachypnea with and episode of oxygen desaturation, patient placed on 2L w/ O2 at 93%   ROS unable to be obtained 2/2 to baseline mentation     Vital Signs   T(F): 99.2   HR: 98  BP: 126/79   RR: 25   SpO2: 94% 2L     GENERAL: NAD, lying comfortably  NERVOUS SYSTEM: opens eyes spontaneously, nonverbal   CHEST/LUNG:  diffuse rhonchorous breath sounds b/l; mildly labored respirations  HEART: S1S2+, Regular rate and rhythm  ABDOMEN: Soft, Nontender, Nondistended; BS+   EXTREMITIES: No LE edema, no tenderness to palpation of b/l LE  SKIN: Warm and dry    TTE on 2/15/24   Left Ventricle:  The left ventricular cavity is normal in size. The interventricular septum is flattened in diastole ('D' shaped left ventricle) consistent with right ventricular volume overload. Left ventricular systolic function is normal with an ejection fraction visually estimated at 60 to 65%. Unable to assess left ventricular diastolic function due to insufficient data.    Volume overload vs aspiration   STAT CXR w/ mild pulm edema, no evidence of aspiration, NGT in place   Will need to repeat CXR in 8 hours to assess potential aspiration   Based on TTE results and CXR, will trial Lasix 40mg IVP x 1   Will start Zosyn empirically  RN to hold TF and IVF for now, will endorse to day shift to follow up  RN to notify with any acute changes 78y/oF PMH CVA (7yrs ago), L-sided craniotomy for abscess 3 yrs ago, hx seizures (on Keppra since 2021) admitted with metabolic encephalopathy 2/2 acute CVAs, Pt intubated for airway protection and extubated 2/18.   Patient started on TF tonight at around 9:30 PM  Patient now with rhonchorous breath sounds and tachypnea with an episode of oxygen desaturation, patient placed on 2L w/ O2 at 93%   ROS unable to be obtained 2/2 to baseline mentation     Vital Signs   T(F): 99.2   HR: 98  BP: 126/79   RR: 25   SpO2: 94% 2L     GENERAL: NAD, lying comfortably  NERVOUS SYSTEM: opens eyes spontaneously, nonverbal   CHEST/LUNG:  diffuse rhonchorous breath sounds b/l; mildly labored respirations  HEART: S1S2+, Regular rate and rhythm  ABDOMEN: Soft, Nontender, Nondistended; BS+   EXTREMITIES: No LE edema, no tenderness to palpation of b/l LE  SKIN: Warm and dry    TTE on 2/15/24   Left Ventricle:  The left ventricular cavity is normal in size. The interventricular septum is flattened in diastole ('D' shaped left ventricle) consistent with right ventricular volume overload. Left ventricular systolic function is normal with an ejection fraction visually estimated at 60 to 65%. Unable to assess left ventricular diastolic function due to insufficient data.    Volume overload vs aspiration   STAT CXR w/ mild pulm edema, no evidence of aspiration, NGT in place   Will need to repeat CXR in 8 hours to assess for potential aspiration   Based on TTE results and CXR, will trial Lasix 40mg IVP x 1   Will start Zosyn empirically  RN to hold TF and IVF for now, will endorse to day shift to follow up  RN to notify with any acute changes 78y/oF PMH CVA (7yrs ago), L-sided craniotomy for abscess 3 yrs ago, hx seizures (on Keppra since 2021) admitted with metabolic encephalopathy 2/2 acute CVAs, Pt intubated for airway protection and extubated 2/18.   Patient started on TF tonight at around 9:30 PM  Patient now with rhonchorous breath sounds and tachypnea with an episode of oxygen desaturation, patient placed on 2L w/ O2 at 93%   ROS unable to be obtained 2/2 to baseline mentation     Vital Signs   T(F): 99.2   HR: 98  BP: 126/79   RR: 25   SpO2: 94% 2L     GENERAL: NAD, lying comfortably  NERVOUS SYSTEM: opens eyes spontaneously, nonverbal   CHEST/LUNG:  diffuse rhonchorous breath sounds b/l; mildly labored respirations  HEART: S1S2+, Regular rate and rhythm  ABDOMEN: Soft, Nontender, Nondistended; BS+   EXTREMITIES: No LE edema, no tenderness to palpation of b/l LE  SKIN: Warm and dry    TTE on 2/15/24   Left Ventricle:  The left ventricular cavity is normal in size. The interventricular septum is flattened in diastole ('D' shaped left ventricle) consistent with right ventricular volume overload. Left ventricular systolic function is normal with an ejection fraction visually estimated at 60 to 65%. Unable to assess left ventricular diastolic function due to insufficient data.    Volume overload vs aspiration   STAT CXR w/ mild pulm edema, no evidence of aspiration, NGT in place   Will need to repeat CXR in 8 hours to assess for potential aspiration   Based on TTE results and CXR, will trial Lasix 40mg IVP x 1   Will start Zosyn empirically  RN to hold TF and IVF for now, will endorse to day shift to follow up  RN to notify with any acute changes    Addendum 6:00  Patient improved s/p lasix, patient appears more comfortable   Less tachypneic 78y/oF PMH CVA (7yrs ago), L-sided craniotomy for abscess 3 yrs ago, hx seizures (on Keppra since 2021) admitted with metabolic encephalopathy 2/2 acute CVAs, Pt intubated for airway protection and extubated 2/18.   Patient started on TF tonight at around 9:30 PM  Patient now with rhonchorous breath sounds and tachypnea with an episode of oxygen desaturation, patient placed on 2L w/ O2 at 93%   ROS unable to be obtained 2/2 to baseline mentation     Vital Signs   T(F): 99.2   HR: 98  BP: 126/79   RR: 25   SpO2: 94% 2L     GENERAL: NAD  NERVOUS SYSTEM: opens eyes spontaneously, nonverbal   CHEST/LUNG:  diffuse rhonchorous breath sounds b/l; mildly labored respirations  HEART: S1S2+, Regular rate and rhythm  ABDOMEN: Soft, Nontender, Nondistended; BS+   EXTREMITIES: No LE edema, no tenderness to palpation of b/l LE  SKIN: Warm and dry    TTE on 2/15/24   Left Ventricle:  The left ventricular cavity is normal in size. The interventricular septum is flattened in diastole ('D' shaped left ventricle) consistent with right ventricular volume overload. Left ventricular systolic function is normal with an ejection fraction visually estimated at 60 to 65%. Unable to assess left ventricular diastolic function due to insufficient data.    Volume overload vs aspiration   STAT CXR w/ mild pulm edema, no evidence of aspiration, NGT in place   Will need to repeat CXR in 8 hours to assess for potential aspiration   Based on TTE results and CXR, will trial Lasix 40mg IVP x 1   Will start Zosyn empirically  RN to hold TF and IVF for now, will endorse to day shift to follow up  RN to notify with any acute changes    Addendum 6:00  Patient improved s/p lasix, patient appears more comfortable   Less tachypneic

## 2024-02-19 NOTE — CONSULT NOTE ADULT - ASSESSMENT
78 year old female, functional and independent at baseline, with a history of a CVA, 7 years ago, left-sided craniotomy for an abscess 3 years ago, seizures (on Keppra since ), presents to [facility] as a transfer from [facility] [place], [place], where she presented after being found on the floor, noted to have a R IT hip fracture, which was repaired, and was subsequently noted w/extensive small infarcts in watershed and posterior vascular territory distribution on an MRI of the brain, for close neuro monitoring and to r/o seizure.  Palliative care consulted for ongoing goals of care.    # CVA  - imaging report noted  - on ASA/statin  - no seizure activity on vEEG  - on Keppra     # dysphagia:  - in the setting of CVA  - NGT in place  - SLP notes reviewed, rec NPO     # s/p right hip fixation:  - no evidence of pain on exam, pt denies pain  - continue to monitor, ordered for APAP prn    # debility:   - supportive care, assist as needed    # palliative care encounter:  - Introduced role of palliative care in symptom management, care planning, support, and transitions in care to those with serious illness/conditions to both son, Geoff, and subsequently to daughter, Meghan  Emotional support offered.  HCP documentation shown: primary agent: spouse, Gomez Frandy (), alt: Geoff Wise  - DNR/I orders  - see GOC note     78 year old female, functional and independent at baseline, with a history of a CVA, 7 years ago, left-sided craniotomy for an abscess 3 years ago, seizures (on Keppra since ), presents to [facility] as a transfer from [facility] [place], where she presented after being found on the floor, noted to have a R IT hip fracture, which was repaired, and was subsequently noted w/extensive small infarcts in watershed and posterior vascular territory distribution on an MRI of the brain, for close neuro monitoring and to r/o seizure.  Palliative care consulted for ongoing goals of care.    # CVA  - imaging report noted  - on ASA/statin  - no seizure activity on vEEG  - on Keppra     # dysphagia:  - in the setting of CVA  - NGT in place  - SLP notes reviewed, rec NPO   - would continue to monitor mental status, re-evaluate as appropriate    # s/p right hip fixation:  - no evidence of pain on exam, pt denies pain  - continue to monitor, ordered for APAP prn    # debility:   - supportive care, assist as needed    # palliative care encounter:  - Introduced role of palliative care in symptom management, care planning, support, and transitions in care to those with serious illness/conditions to both son, Geoff, and subsequently to daughter, Meghan  Emotional support offered.  - HCP documentation shown: primary agent: spouse, Jason Wise (), alt: Geoff Wise  - DNR/I orders  - see [facility] note    Patient reviewed with hospitalist, RN.  Will follow as clinical course unfolds.  Thank you for involving the palliative care team in the care of this patient.

## 2024-02-19 NOTE — CONSULT NOTE ADULT - CONVERSATION DETAILS
With son, Geoff:  - Clinical course reviewed.    - Geoff notes that patient has not been the same since after surgery at [facility].  Offers that he movement is limited, she cannot talk as she has done so, and cannot eat.  - Describes patient as one who would call and check in on others, has taken great pride in her appearance (worked as a  for 50 years).    - He offers that he knows that per patient's living will, she does not want a feeding tube long term and inquires about trajectory for the patient if she cannot eat.  - Writer notes that people can make functional improvements after a stroke, but that improvements can take some time to see, sometimes as a long as months.  Also explained that the NGT in place, cannot remain there long term as risk> benefits and explained that if patient is unable to safely take things by mouth and if a feeding tube placement is not sought, then pleasure tastes/feeds may be pursued, optimizing pt's safety/minimizing risk for aspiration when this is done.  There is a risk of aspiration.  Noted that if patient is in this situation, then hospice could be explored.  - Hospice, philosophy and locations discussed.  - Geoff offers that his brother is coming in from [place] and they will have an opportunity to speak as a family.    With Geoff and Meghan:   - Distinguished between hospice and palliative care.  - Noted difficulty of the situation and present efforts taken by both clinicians and family to assure that there was nothing that was missed that could otherwise be addressed to improve her mental status, even prompting transfer from [facility] to [facility].  - It was noted that family is coming in to visit and that they will talk together as a family and work with clinicians to navigate next steps in care with clinicians.  - Amenable to palliative care follow up.    Emotional support provided.  All questions answered.    Total time spent in GOC discussions: 25 minutes. With son, Geoff:  - Clinical course reviewed.    - Geoff notes that patient has not been the same since after surgery at [facility].  Offers that he movement is limited, she cannot talk, and cannot eat.  - Describes patient as one who would call and check in on others, has taken great pride in her appearance (worked as a  for 50 years).    - He offers that he knows that per patient's living will, she does not want a feeding tube long term and inquires about trajectory for the patient if she cannot eat.  - Writer notes that people can make functional improvements after a stroke, but that improvements can take some time to see, sometimes as a long as months.  Also explained that the NGT in place, cannot remain there long term as risk> benefits and explained that if patient is unable to safely take things by mouth and if a feeding tube placement is not sought, then pleasure tastes/feeds may be pursued, optimizing pt's safety/minimizing risk for aspiration when this is done.  There is a risk of aspiration.  Noted that if patient is in this situation, then hospice could be explored.  - Hospice, philosophy and locations discussed.  - Geoff offers that his brother is coming in from [place] and they will have an opportunity to speak as a family.    With Geoff and Meghan:   - Distinguished between hospice and palliative care.  - Noted difficulty of the situation and present efforts taken by both clinicians and family to assure that there was nothing that was missed that could otherwise be addressed to improve her mental status, even prompting transfer from [facility] to [facility].  - It was noted that family is coming in to visit and that they will talk together as a family and work with clinicians to navigate next steps in care with clinicians.  - Amenable to palliative care follow up.    Emotional support provided.  All questions answered.    Total time spent in GOC discussions: 25 minutes.

## 2024-02-19 NOTE — PROGRESS NOTE ADULT - SUBJECTIVE AND OBJECTIVE BOX
LULY LUZ    [id number]    78y      Female    CC: ams    INTERVAL HPI/OVERNIGHT EVENTS: pt seen and examined. o/n with some tachypnea, hypoxia, improved with 2LNC. given dose lasix, TF and IVF held.     REVIEW OF SYSTEMS:  unable to obtain     Vital Signs Last 24 Hrs  T(C): 36.6 (19 Feb 2024 07:44), Max: 37.3 (18 Feb 2024 17:00)  T(F): 97.9 (19 Feb 2024 07:44), Max: 99.2 (19 Feb 2024 00:32)  HR: 74 (19 Feb 2024 07:44) (74 - 110)  BP: 106/66 (19 Feb 2024 07:44) (106/66 - 137/84)  BP(mean): 102 (18 Feb 2024 18:00) (97 - 102)  RR: 18 (19 Feb 2024 07:44) (18 - 28)  SpO2: 98% (19 Feb 2024 07:44) (91% - 100%)    Parameters below as of 19 Feb 2024 07:44  Patient On (Oxygen Delivery Method): nasal cannula        PHYSICAL EXAM:    GENERAL: NAD  HEENT: opens eyes spontaneously; blinks to threat; +NGT in place   CHEST/LUNG: course sounds b/l, respirations unlabored on NC   HEART: S1S2+, Regular rate and rhythm  ABDOMEN: Soft, Nontender, Nondistended; Bowel sounds present  SKIN: warm, dry   MSK: +R hip dressing c/d/i; no surrounding erythema or ecchymosis   NEURO: awake; LUE/LLE moving spontaneously; RUE/RLE withdraws to noxious stimuli; family reporting intermittently following some commands     LABS:                        9.0    8.42  )-----------( 211      ( 19 Feb 2024 05:12 )             25.8     02-19    143  |  102  |  16.3  ----------------------------<  148<H>  3.1<L>   |  26.0  |  0.46<L>    Ca    8.0<L>      19 Feb 2024 05:12  Phos  3.3     02-19  Mg     2.0     02-19    TPro  5.2<L>  /  Alb  2.9<L>  /  TBili  1.8  /  DBili  0.3  /  AST  29  /  ALT  14  /  AlkPhos  56  02-19    PT/INR - ( 17 Feb 2024 17:20 )   PT: 12.9 sec;   INR: 1.17 ratio           Urinalysis Basic - ( 19 Feb 2024 05:12 )    Color: x / Appearance: x / SG: x / pH: x  Gluc: 148 mg/dL / Ketone: x  / Bili: x / Urobili: x   Blood: x / Protein: x / Nitrite: x   Leuk Esterase: x / RBC: x / WBC x   Sq Epi: x / Non Sq Epi: x / Bacteria: x          MEDICATIONS  (STANDING):  aspirin  chewable 81 milliGRAM(s) Oral daily  atorvastatin 40 milliGRAM(s) Oral at bedtime  heparin   Injectable 5000 Unit(s) SubCutaneous every 8 hours  levETIRAcetam   Injectable 1000 milliGRAM(s) IV Push every 12 hours  pantoprazole  Injectable 40 milliGRAM(s) IV Push daily  piperacillin/tazobactam IVPB.. 3.375 Gram(s) IV Intermittent every 8 hours  polyethylene glycol 3350 17 Gram(s) Oral every 12 hours  potassium chloride  10 mEq/100 mL IVPB 10 milliEquivalent(s) IV Intermittent every 1 hour  senna 2 Tablet(s) Oral at bedtime    MEDICATIONS  (PRN):  acetaminophen     Tablet .. 650 milliGRAM(s) Oral every 6 hours PRN Mild Pain (1 - 3)      RADIOLOGY & ADDITIONAL TESTS:

## 2024-02-19 NOTE — PROGRESS NOTE ADULT - ASSESSMENT
78y/oF PMH CVA (7yrs ago), L-sided craniotomy for abscess 3 yrs ago, hx seizures (on Keppra since 2021) presenting to [facility] ER 2/13 after found down for ~10hrs. Pt with reported confusion, impaired speech and hypoxia to spO2 72. CTH and CTA head, neck, perfusion without acute findings. Found to have R IT hip fx, repaired 2/14. on 2/15, pt found to be unresponsive with intermittent hypoxia. MRI brain with extensive small infarcts in watershed and posterior vascular territory distribution. Seen by Palliative care, initially transitioned to comfort care and DNR/DNI code status. After review of the care by Dr. Gaitan and Dr. Shipley, it was felt that the infarcts alone were not extensive enough to cause the depressed level of consciousness and that non-convulsive status should be ruled out. Family revoked comfort care status, pt upgraded back to ICU after receiving dose Keppra and Versed. Pt intubated for airway protection. Transferred to [facility] 2/17 under NSICU care for q1h neurochecks and r/o seizure. Pt continued on Keppra 1000mg BID. cvEEG without seizures. Extubated 2/18 to NC. Mental status/neuro exam improving. Downgraded to floor 2/18.       metabolic encephalopathy 2/2   acute CVAs   status epilepticus ruled out   hx seizure disorder   rule out infection  -s/p NSICU   -CTH, CTA head/neck reviewed (HIE)   -MRI brain 2/15 reviewed (HIE): numerous areas acute infarcts seen throughout posterior fossa and supratentorial regions   -cvEEG without seizures   -cont asa, statin   -extubated 2/18 (intubated prior to transfer for airway protection)   -fall precautions  -seizure precautions   -aspiration precautions   -cont neurochecks   -neuro recs appreciated  -cont Keppra  -hgba1c 5.6   -LDL 46  -cultures ngtd  -CXR reviewed  -UA reviewed   -SLP rec NPO 2/19    R hip fx   -s/p R hip fixation (2/14) at [facility]   -as per ortho note WBAT   -f/u PT/OT     Hypokalemia   -repleted   -f/u am bmp    Anemia  suspect some postop blood loss   -f/u am cbc, am labs   -monitor for s/sx active bleeding     vte ppx: heparin sq     MOLST and living will in chart     Palliative following

## 2024-02-19 NOTE — CONSULT NOTE ADULT - SUBJECTIVE AND OBJECTIVE BOX
[facility]                                        Neurology at [place]                                  Ramiro Paul, [facility] [address]                                      [address]                                           [place], [place], [address]                                                [phone]        CC: strokes and altered mental status     HISTORY:  The patient is a 78y Female who presented to [facility] after being found on the floor. She was found to have right hip fracture. She underwent repair on 2/14/24 and did not awaken after surgery.   Brain MRI showed multiple acute infarcts throughout both cerebral hemispheres predominantly in watershed and posterior vascular territories.   She remained poorly responsive and was transferred to [facility] to rule out non convulsive status epilepticus. (She had been intubated and admitted to ICU).   EEG showed that she was not having active seizures. She did have risk for focal seizures arising from the left temporal region and bifrontal regions. Keppra was started.  She was extubated and downgraded 2/18/24.  She is being seen by the palliative care service.     PAST MEDICAL & SURGICAL HISTORY:  Seizure  Stroke  Hyperlipidemia  H/O craniotomy  History of hip surgery    MEDICATIONS  (STANDING):  aspirin  chewable 81 milliGRAM(s) Oral daily  atorvastatin 40 milliGRAM(s) Oral at bedtime  heparin   Injectable 5000 Unit(s) SubCutaneous every 8 hours  levETIRAcetam   Injectable 1000 milliGRAM(s) IV Push every 12 hours  multiple electrolytes Injection Type 1 1000 milliLiter(s) (75 mL/Hr) IV Continuous <Continuous>  pantoprazole  Injectable 40 milliGRAM(s) IV Push daily  piperacillin/tazobactam IVPB.. 3.375 Gram(s) IV Intermittent every 8 hours  polyethylene glycol 3350 17 Gram(s) Oral every 12 hours  potassium chloride  10 mEq/100 mL IVPB 10 milliEquivalent(s) IV Intermittent every 1 hour  senna 2 Tablet(s) Oral at bedtime    MEDICATIONS  (PRN):  acetaminophen     Tablet .. 650 milliGRAM(s) Oral every 6 hours PRN Mild Pain (1 - 3)    Allergies  No Known Allergies    SOCIAL HISTORY:  Non smoker.     FAMILY HISTORY:  Unobtainable due to patient's condition.     ROS:  Constitutional: Unobtainable due to patient's condition.   Neuro: Unobtainable due to patient's condition.   Eyes: Unobtainable due to patient's condition.   Ears/nose/throat: Unobtainable due to patient's condition.   Cardiac: Unobtainable due to patient's condition.   Respiratory: Unobtainable due to patient's condition.   GI: Unobtainable due to patient's condition.   : Unobtainable due to patient's condition..  Integumentary: Unobtainable due to patient's condition.  Psych: Unobtainable due to patient's condition.  Heme: Unobtainable due to patient's condition.     Exam:  Vital Signs Last 24 Hrs  T(C): 36.6 (19 Feb 2024 07:44), Max: 37.4 (18 Feb 2024 11:00)  T(F): 97.9 (19 Feb 2024 07:44), Max: 99.3 (18 Feb 2024 11:00)  HR: 74 (19 Feb 2024 07:44) (74 - 110)  BP: 106/66 (19 Feb 2024 07:44) (106/66 - 154/82)  BP(mean): 102 (18 Feb 2024 18:00) (91 - 103)  RR: 18 (19 Feb 2024 07:44) (17 - 28)  SpO2: 98% (19 Feb 2024 07:44) (89% - 100%)    Parameters below as of 19 Feb 2024 07:44  Patient On (Oxygen Delivery Method): nasal cannula    General: NAD.   Carotid bruits absent.     Mental status: The patient is unresponsive to voice. She is non verbal Her eyes are open but she does not track with gaze or follow instructions.    Cranial nerves: There is no papilledema. Pupils react symmetrically to light. She blinks to threat bilaterally. She does not track with gaze. Face is grossly symmetric. Palate and tongue cannot be assessed.     Motor/Sensory: There is decreased tone bilaterally.  Minimal movement on the left side to stimuli. No movement on right.     Reflexes: 1+ throughout and plantar responses are flexor.    Cerebellar: Cannot be tested.     LABS:                         9.0    8.42  )-----------( 211      ( 19 Feb 2024 05:12 )             25.8       02-19    143  |  102  |  16.3  ----------------------------<  148<H>  3.1<L>   |  26.0  |  0.46<L>    Ca    8.0<L>      19 Feb 2024 05:12  Phos  3.3     02-19  Mg     2.0     02-19    TPro  5.2<L>  /  Alb  2.9<L>  /  TBili  1.8  /  DBili  0.3  /  AST  29  /  ALT  14  /  AlkPhos  56  02-19  PT/INR - ( 17 Feb 2024 17:20 )   PT: 12.9 sec;   INR: 1.17 ratio        RADIOLOGY   Brain MRI from [facility] on 2/15/24 images reviewed.   There are numerous acute infarcts throughout the bilateral hemispheres and posterior fossa.

## 2024-02-19 NOTE — CONSULT NOTE ADULT - SUBJECTIVE AND OBJECTIVE BOX
HPI:  78 year old female, otherwise independent and functional with a prior history of stroke 7 years ago, left-sided craniotomy for an abscess 3 years ago, seizures (on Keppra since 2021) presented the emergency room on 2/13/2024 after having being found on the floor after approx 10 hours. At the time, she was confused, had impaired speech and reportedly somewhat hypoxic with PO2 of 72. She underwent a full trauma workup with unremarkable CT brain and CT angiogram of the brain. However she was found to have R IT hip fracture, which was repaired on 2/14.     On 2/15 am she was found to be unresponsive and has since remained unresponsive with episodes of hypoxia. MRI of the brain which showed extensive small infarcts in watershed and posterior vascular territory distribution. Palliative care discussed with family and she was initially made DNR/DNI and comfort care.     After review of the case by Dr. Gaitan and Dr. Shipley, it was felt that the infarcts alone were not extensive enough to cause the depressed level of consciousness. Since patient is at increased risk of seizures due to her previous insult and with the most recent infarcts as well, it was agreed upon by the multidisciplinary team and family that the   possibility of non-convulsive status should at least be ruled out.     Family revoked comfort care status and opted for DNR with a trial of intubation. Patient was upgraded back to ICU after receiving dose of keppra and versed. Given   her tenuous respiratory status and state of unresponsiveness, patient was intubated for airway protection. She is accepted transfer to [facility] Neuro ICU for eeg monitoring    (17 Feb 2024 15:08)      HPI:    PERTINENT PMH REVIEWED: Yes No    PAST MEDICAL & SURGICAL HISTORY:  Seizure      Stroke      Hyperlipidemia      H/O craniotomy      History of hip surgery          SOCIAL HISTORY:                                     Admitted from:  home  SNF  [facility]     Surrogate/HCP/Guardian: Phone#:    FAMILY HISTORY:      Baseline ADLs (prior to admission):  Independent/ Dependent      Allergies    No Known Allergies    Intolerances        Present Symptoms:     Dyspnea: 0 1 2 3   Nausea/Vomiting: Yes No  Anxiety:  Yes No  Depression: Yes No  Fatigue: Yes No  Loss of appetite: Yes No    Pain:             Character-            Duration-            Effect-            Factors-            Frequency-            Location-            Severity-    Review of Systems: Reviewed                     Negative:                     Positive:  Unable to obtain due to poor mentation   All others negative    MEDICATIONS  (STANDING):  aspirin  chewable 81 milliGRAM(s) Oral daily  atorvastatin 40 milliGRAM(s) Oral at bedtime  heparin   Injectable 5000 Unit(s) SubCutaneous every 8 hours  levETIRAcetam   Injectable 1000 milliGRAM(s) IV Push every 12 hours  multiple electrolytes Injection Type 1 1000 milliLiter(s) (75 mL/Hr) IV Continuous <Continuous>  pantoprazole  Injectable 40 milliGRAM(s) IV Push daily  piperacillin/tazobactam IVPB.. 3.375 Gram(s) IV Intermittent every 8 hours  polyethylene glycol 3350 17 Gram(s) Oral every 12 hours  potassium chloride  10 mEq/100 mL IVPB 10 milliEquivalent(s) IV Intermittent every 1 hour  senna 2 Tablet(s) Oral at bedtime    MEDICATIONS  (PRN):  acetaminophen     Tablet .. 650 milliGRAM(s) Oral every 6 hours PRN Mild Pain (1 - 3)      PHYSICAL EXAM:    Vital Signs Last 24 Hrs  T(C): 36.6 (19 Feb 2024 07:44), Max: 37.4 (18 Feb 2024 11:00)  T(F): 97.9 (19 Feb 2024 07:44), Max: 99.3 (18 Feb 2024 11:00)  HR: 74 (19 Feb 2024 07:44) (74 - 110)  BP: 106/66 (19 Feb 2024 07:44) (106/66 - 154/82)  BP(mean): 102 (18 Feb 2024 18:00) (91 - 103)  RR: 18 (19 Feb 2024 07:44) (17 - 28)  SpO2: 98% (19 Feb 2024 07:44) (89% - 100%)    Parameters below as of 19 Feb 2024 07:44  Patient On (Oxygen Delivery Method): nasal cannula        General: alert  oriented x ____ lethargic agitated                  cachexia  nonverbal  coma    Karnofsky:  %    HEENT: normal  dry mouth  ET tube/trach    Lungs: comfortable tachypnea/labored breathing  excessive secretions    CV: normal  tachycardia    GI: normal  distended  tender  no BS               PEG/NG/OG tube  constipation  last BM:     : normal  incontinent  oliguria/anuria  lugo    MSK: normal  weakness  edema             ambulatory  bedbound/wheelchair bound    Skin: normal  pressure ulcers- Stage_____  no rash    LABS:                        9.0    8.42  )-----------( 211      ( 19 Feb 2024 05:12 )             25.8     02-19    143  |  102  |  16.3  ----------------------------<  148<H>  3.1<L>   |  26.0  |  0.46<L>    Ca    8.0<L>      19 Feb 2024 05:12  Phos  3.3     02-19  Mg     2.0     02-19    TPro  5.2<L>  /  Alb  2.9<L>  /  TBili  1.8  /  DBili  0.3  /  AST  29  /  ALT  14  /  AlkPhos  56  02-19    PT/INR - ( 17 Feb 2024 17:20 )   PT: 12.9 sec;   INR: 1.17 ratio           Urinalysis Basic - ( 19 Feb 2024 05:12 )    Color: x / Appearance: x / SG: x / pH: x  Gluc: 148 mg/dL / Ketone: x  / Bili: x / Urobili: x   Blood: x / Protein: x / Nitrite: x   Leuk Esterase: x / RBC: x / WBC x   Sq Epi: x / Non Sq Epi: x / Bacteria: x      I&O's Summary    18 Feb 2024 07:01  -  19 Feb 2024 07:00  --------------------------------------------------------  IN: 1831.4 mL / OUT: 2085 mL / NET: -253.6 mL        RADIOLOGY & ADDITIONAL STUDIES:    ADVANCE DIRECTIVES:   DNR YES NO  Completed on:                     MOLST  YES NO   Completed on:  Living Will  YES NO   Completed on:           HPI:  78 year old female, otherwise independent and functional with a prior history of stroke 7 years ago, left-sided craniotomy for an abscess 3 years ago, seizures (on Keppra since 2021) presented the emergency room on 2/13/2024 after having being found on the floor after approx 10 hours. At the time, she was confused, had impaired speech and reportedly somewhat hypoxic with PO2 of 72. She underwent a full trauma workup with unremarkable CT brain and CT angiogram of the brain. However she was found to have R IT hip fracture, which was repaired on 2/14.     On 2/15 am she was found to be unresponsive and has since remained unresponsive with episodes of hypoxia. MRI of the brain which showed extensive small infarcts in watershed and posterior vascular territory distribution. Palliative care discussed with family and she was initially made DNR/DNI and comfort care.     After review of the case by Dr. Gaitan and Dr. Shipley, it was felt that the infarcts alone were not extensive enough to cause the depressed level of consciousness. Since patient is at increased risk of seizures due to her previous insult and with the most recent infarcts as well, it was agreed upon by the multidisciplinary team and family that the   possibility of non-convulsive status should at least be ruled out.     Family revoked comfort care status and opted for DNR with a trial of intubation. Patient was upgraded back to ICU after receiving dose of keppra and versed. Given   her tenuous respiratory status and state of unresponsiveness, patient was intubated for airway protection. She is accepted transfer to [facility] Neuro ICU for eeg monitoring    (17 Feb 2024 15:08)    HPI:  78 year old female, functional and independent at baseline, with a history of a CVA, 7 years ago, left-sided craniotomy for an abscess 3 years ago, seizures (on Keppra since 2021), presents to [facility] as a transfer from [facility].  Per admission note, patient presented to [facility] on 2/13/24, after being found on the floor for approximately 10 hrs.  On presentation, patient reported to be confused, with impaired speech, and reportedly somewhat hypoxic.  At the time, imaging of the brain was unrevealing, however, patient was found to have a R IT hip fracture, which was repaired on 2/14.   Found unresponsive on 2/15 am, with ongoing unresponsiveness and episodes of hypoxia.  An MRI of the brain showed extensive small infarcts in watershed and posterior vascular territory distribution. Palliative care spoke with family and she was made DNR/DNI and comfort care.     After review of the case by Dr. Gaitan and Dr. Shipley, it was felt that the infarcts alone were not extensive enough to cause the depressed level of consciousness. Since patient is at increased risk of seizures due to her previous insult and with the most recent infarcts as well, it was agreed upon by the multidisciplinary team and family that the possibility of non-convulsive status should be ruled out.  Per notes, family revoked comfort care status and opted for DNR with a trial of intubation, and upgraded back tto the ICU, where she was intubated for airway protection.  She was accepted as a transfer to [facility] Neuro ICU for close neuro monitoring and to r/o seizure.  No seizures noted on vEEG.  Patient extubated on 2/18 and downgraded to medicine.    Palliative care consulted for ongoing goals of care.  PERTINENT PMH REVIEWED: Yes No    PAST MEDICAL & SURGICAL HISTORY:  Seizure      Stroke      Hyperlipidemia      H/O craniotomy      History of hip surgery          SOCIAL HISTORY:                                     Admitted from:  home  SNF  [facility]     Surrogate/HCP/Guardian: Phone#:    FAMILY HISTORY:      Baseline ADLs (prior to admission):  Independent/ Dependent      Allergies    No Known Allergies    Intolerances        Present Symptoms:     Dyspnea: 0 1 2 3   Nausea/Vomiting: Yes No  Anxiety:  Yes No  Depression: Yes No  Fatigue: Yes No  Loss of appetite: Yes No    Pain:             Character-            Duration-            Effect-            Factors-            Frequency-            Location-            Severity-    Review of Systems: Reviewed                     Negative:                     Positive:  Unable to obtain due to poor mentation   All others negative    MEDICATIONS  (STANDING):  aspirin  chewable 81 milliGRAM(s) Oral daily  atorvastatin 40 milliGRAM(s) Oral at bedtime  heparin   Injectable 5000 Unit(s) SubCutaneous every 8 hours  levETIRAcetam   Injectable 1000 milliGRAM(s) IV Push every 12 hours  multiple electrolytes Injection Type 1 1000 milliLiter(s) (75 mL/Hr) IV Continuous <Continuous>  pantoprazole  Injectable 40 milliGRAM(s) IV Push daily  piperacillin/tazobactam IVPB.. 3.375 Gram(s) IV Intermittent every 8 hours  polyethylene glycol 3350 17 Gram(s) Oral every 12 hours  potassium chloride  10 mEq/100 mL IVPB 10 milliEquivalent(s) IV Intermittent every 1 hour  senna 2 Tablet(s) Oral at bedtime    MEDICATIONS  (PRN):  acetaminophen     Tablet .. 650 milliGRAM(s) Oral every 6 hours PRN Mild Pain (1 - 3)      PHYSICAL EXAM:    Vital Signs Last 24 Hrs  T(C): 36.6 (19 Feb 2024 07:44), Max: 37.4 (18 Feb 2024 11:00)  T(F): 97.9 (19 Feb 2024 07:44), Max: 99.3 (18 Feb 2024 11:00)  HR: 74 (19 Feb 2024 07:44) (74 - 110)  BP: 106/66 (19 Feb 2024 07:44) (106/66 - 154/82)  BP(mean): 102 (18 Feb 2024 18:00) (91 - 103)  RR: 18 (19 Feb 2024 07:44) (17 - 28)  SpO2: 98% (19 Feb 2024 07:44) (89% - 100%)    Parameters below as of 19 Feb 2024 07:44  Patient On (Oxygen Delivery Method): nasal cannula        General: alert  oriented x ____ lethargic agitated                  cachexia  nonverbal  coma    Karnofsky:  %    HEENT: normal  dry mouth  ET tube/trach    Lungs: comfortable tachypnea/labored breathing  excessive secretions    CV: normal  tachycardia    GI: normal  distended  tender  no BS               PEG/NG/OG tube  constipation  last BM:     : normal  incontinent  oliguria/anuria  lugo    MSK: normal  weakness  edema             ambulatory  bedbound/wheelchair bound    Skin: normal  pressure ulcers- Stage_____  no rash    LABS:                        9.0    8.42  )-----------( 211      ( 19 Feb 2024 05:12 )             25.8     02-19    143  |  102  |  16.3  ----------------------------<  148<H>  3.1<L>   |  26.0  |  0.46<L>    Ca    8.0<L>      19 Feb 2024 05:12  Phos  3.3     02-19  Mg     2.0     02-19    TPro  5.2<L>  /  Alb  2.9<L>  /  TBili  1.8  /  DBili  0.3  /  AST  29  /  ALT  14  /  AlkPhos  56  02-19    PT/INR - ( 17 Feb 2024 17:20 )   PT: 12.9 sec;   INR: 1.17 ratio           Urinalysis Basic - ( 19 Feb 2024 05:12 )    Color: x / Appearance: x / SG: x / pH: x  Gluc: 148 mg/dL / Ketone: x  / Bili: x / Urobili: x   Blood: x / Protein: x / Nitrite: x   Leuk Esterase: x / RBC: x / WBC x   Sq Epi: x / Non Sq Epi: x / Bacteria: x      I&O's Summary    18 Feb 2024 07:01  -  19 Feb 2024 07:00  --------------------------------------------------------  IN: 1831.4 mL / OUT: 2085 mL / NET: -253.6 mL        RADIOLOGY & ADDITIONAL STUDIES:    ADVANCE DIRECTIVES:   DNR YES NO  Completed on:                     MOLST  YES NO   Completed on:  Living Will  YES NO   Completed on:           HPI:  78 year old female, otherwise independent and functional with a prior history of stroke 7 years ago, left-sided craniotomy for an abscess 3 years ago, seizures (on Keppra since 2021) presented the emergency room on 2/13/2024 after having being found on the floor after approx 10 hours. At the time, she was confused, had impaired speech and reportedly somewhat hypoxic with PO2 of 72. She underwent a full trauma workup with unremarkable CT brain and CT angiogram of the brain. However she was found to have R IT hip fracture, which was repaired on 2/14.     On 2/15 am she was found to be unresponsive and has since remained unresponsive with episodes of hypoxia. MRI of the brain which showed extensive small infarcts in watershed and posterior vascular territory distribution. Palliative care discussed with family and she was initially made DNR/DNI and comfort care.     After review of the case by Dr. Gaitan and Dr. Shipley, it was felt that the infarcts alone were not extensive enough to cause the depressed level of consciousness. Since patient is at increased risk of seizures due to her previous insult and with the most recent infarcts as well, it was agreed upon by the multidisciplinary team and family that the   possibility of non-convulsive status should at least be ruled out.     Family revoked comfort care status and opted for DNR with a trial of intubation. Patient was upgraded back to ICU after receiving dose of keppra and versed. Given   her tenuous respiratory status and state of unresponsiveness, patient was intubated for airway protection. She is accepted transfer to [facility] Neuro ICU for eeg monitoring    (17 Feb 2024 15:08)    HPI:  78 year old female, functional and independent at baseline, with a history of a CVA, 7 years ago, left-sided craniotomy for an abscess 3 years ago, seizures (on Keppra since 2021), presents to [facility] as a [place] [facility]  Per admission note, patient presented to [facility] on 2/13/24, after being found on the floor for approximately 10 hrs.  On presentation, patient reported to be confused, with impaired speech, and reportedly somewhat hypoxic.  At the time, imaging of the brain was unrevealing, however, patient was found to have a R IT hip fracture, which was repaired on 2/14.   Found unresponsive on 2/15 am, with ongoing unresponsiveness and episodes of hypoxia.  An MRI of the brain showed extensive small infarcts in watershed and posterior vascular territory distribution. Palliative care spoke with family and she was made DNR/DNI and comfort care.     After review of the case by Dr. Gaitan and Dr. Shipley, it was felt that the infarcts alone were not extensive enough to cause the depressed level of consciousness. Since patient is at increased risk of seizures due to her previous insult and with the most recent infarcts as well, it was agreed upon by the multidisciplinary team and family that the possibility of non-convulsive status should be ruled out.  Per notes, family revoked comfort care status and opted for DNR with a trial of intubation, and upgraded back tto the ICU, where she was intubated for airway protection.  She was accepted as a transfer to [facility] Neuro ICU for close neuro monitoring and to r/o seizure.  No seizures noted on vEEG.  Patient extubated on 2/18 and downgraded to medicine.    Palliative care consulted for ongoing goals of care.    Patient seen ans examined at the bedside.  Limited information obtained from patient.  Patient shakes head to communicate few, simple answers.  Denies pain, shortness of breath, nausea.  Unable to obtain further information from patient.    Patient's son, Iftikhar, at bedside, at the time of interview and exam.  His sister (patient's daughter, Meghan), joins pt and son at bedside, later in the visit.    Son, Iftikhar, affirms history prompting presentation at [facility] and subsequently here to [facility].  Offers that patient was found at home, as patient's daughter, Meghan and patient spend part of every day together, and are in touch with one another over the course of the day.  When patient could not be reached via phone, Meghan went over to the patient's house and found her on the floor with unclear speech, prompting presentation at [facility].  He affirms course at [facility], and offers that there was no vEEG available at [facility], resulting in her transfer here to [facility].  States that there are were no eizures found on EEG, that the patient was able to be extubated yesterday,    PERTINENT PMH REVIEWED: Yes No    PAST MEDICAL & SURGICAL HISTORY:  Seizure      Stroke      Hyperlipidemia      H/O craniotomy      History of hip surgery          SOCIAL HISTORY:                                     Admitted from:  home  SNF  [facility]     Surrogate/HCP/Guardian: Phone#:    FAMILY HISTORY:      Baseline ADLs (prior to admission):  Independent/ Dependent      Allergies    No Known Allergies    Intolerances        Present Symptoms:     Dyspnea: 0 1 2 3   Nausea/Vomiting: Yes No  Anxiety:  Yes No  Depression: Yes No  Fatigue: Yes No  Loss of appetite: Yes No    Pain:             Character-            Duration-            Effect-            Factors-            Frequency-            Location-            Severity-    Review of Systems: Reviewed                     Negative:                     Positive:  Unable to obtain due to poor mentation   All others negative    MEDICATIONS  (STANDING):  aspirin  chewable 81 milliGRAM(s) Oral daily  atorvastatin 40 milliGRAM(s) Oral at bedtime  heparin   Injectable 5000 Unit(s) SubCutaneous every 8 hours  levETIRAcetam   Injectable 1000 milliGRAM(s) IV Push every 12 hours  multiple electrolytes Injection Type 1 1000 milliLiter(s) (75 mL/Hr) IV Continuous <Continuous>  pantoprazole  Injectable 40 milliGRAM(s) IV Push daily  piperacillin/tazobactam IVPB.. 3.375 Gram(s) IV Intermittent every 8 hours  polyethylene glycol 3350 17 Gram(s) Oral every 12 hours  potassium chloride  10 mEq/100 mL IVPB 10 milliEquivalent(s) IV Intermittent every 1 hour  senna 2 Tablet(s) Oral at bedtime    MEDICATIONS  (PRN):  acetaminophen     Tablet .. 650 milliGRAM(s) Oral every 6 hours PRN Mild Pain (1 - 3)      PHYSICAL EXAM:    Vital Signs Last 24 Hrs  T(C): 36.6 (19 Feb 2024 07:44), Max: 37.4 (18 Feb 2024 11:00)  T(F): 97.9 (19 Feb 2024 07:44), Max: 99.3 (18 Feb 2024 11:00)  HR: 74 (19 Feb 2024 07:44) (74 - 110)  BP: 106/66 (19 Feb 2024 07:44) (106/66 - 154/82)  BP(mean): 102 (18 Feb 2024 18:00) (91 - 103)  RR: 18 (19 Feb 2024 07:44) (17 - 28)  SpO2: 98% (19 Feb 2024 07:44) (89% - 100%)    Parameters below as of 19 Feb 2024 07:44  Patient On (Oxygen Delivery Method): nasal cannula        General: alert  oriented x ____ lethargic agitated                  cachexia  nonverbal  coma    Karnofsky:  %    HEENT: normal  dry mouth  ET tube/trach    Lungs: comfortable tachypnea/labored breathing  excessive secretions    CV: normal  tachycardia    GI: normal  distended  tender  no BS               PEG/NG/OG tube  constipation  last BM:     : normal  incontinent  oliguria/anuria  lugo    MSK: normal  weakness  edema             ambulatory  bedbound/wheelchair bound    Skin: normal  pressure ulcers- Stage_____  no rash    LABS:                        9.0    8.42  )-----------( 211      ( 19 Feb 2024 05:12 )             25.8     02-19    143  |  102  |  16.3  ----------------------------<  148<H>  3.1<L>   |  26.0  |  0.46<L>    Ca    8.0<L>      19 Feb 2024 05:12  Phos  3.3     02-19  Mg     2.0     02-19    TPro  5.2<L>  /  Alb  2.9<L>  /  TBili  1.8  /  DBili  0.3  /  AST  29  /  ALT  14  /  AlkPhos  56  02-19    PT/INR - ( 17 Feb 2024 17:20 )   PT: 12.9 sec;   INR: 1.17 ratio           Urinalysis Basic - ( 19 Feb 2024 05:12 )    Color: x / Appearance: x / SG: x / pH: x  Gluc: 148 mg/dL / Ketone: x  / Bili: x / Urobili: x   Blood: x / Protein: x / Nitrite: x   Leuk Esterase: x / RBC: x / WBC x   Sq Epi: x / Non Sq Epi: x / Bacteria: x      I&O's Summary    18 Feb 2024 07:01  -  19 Feb 2024 07:00  --------------------------------------------------------  IN: 1831.4 mL / OUT: 2085 mL / NET: -253.6 mL        RADIOLOGY & ADDITIONAL STUDIES:    ADVANCE DIRECTIVES:   DNR YES NO  Completed on:                     MOLST  YES NO   Completed on:  Living Will  YES NO   Completed on:           HPI:  78 year old female, otherwise independent and functional with a prior history of stroke 7 years ago, left-sided craniotomy for an abscess 3 years ago, seizures (on Keppra since 2021) presented the emergency room on 2/13/2024 after having being found on the floor after approx 10 hours. At the time, she was confused, had impaired speech and reportedly somewhat hypoxic with PO2 of 72. She underwent a full trauma workup with unremarkable CT brain and CT angiogram of the brain. However she was found to have R IT hip fracture, which was repaired on 2/14.     On 2/15 am she was found to be unresponsive and has since remained unresponsive with episodes of hypoxia. MRI of the brain which showed extensive small infarcts in watershed and posterior vascular territory distribution. Palliative care discussed with family and she was initially made DNR/DNI and comfort care.     After review of the case by Dr. Gaitan and Dr. Shipley, it was felt that the infarcts alone were not extensive enough to cause the depressed level of consciousness. Since patient is at increased risk of seizures due to her previous insult and with the most recent infarcts as well, it was agreed upon by the multidisciplinary team and family that the   possibility of non-convulsive status should at least be ruled out.     Family revoked comfort care status and opted for DNR with a trial of intubation. Patient was upgraded back to ICU after receiving dose of keppra and versed. Given   her tenuous respiratory status and state of unresponsiveness, patient was intubated for airway protection. She is accepted transfer to [facility] Neuro ICU for eeg monitoring    (17 Feb 2024 15:08)    HPI:  78 year old female, functional and independent at baseline, with a history of a CVA, 7 years ago, left-sided craniotomy for an abscess 3 years ago, seizures (on Keppra since 2021), [place] to [facility] [place] [facility]  Per admission note, patient presented to [facility] [place] 2/13/24, after being found on the floor for approximately 10 hrs.  On presentation, patient reported to be confused, with impaired speech, and reportedly somewhat hypoxic.  At the time, imaging of the brain was unrevealing, however, patient was found to have a R IT hip fracture, which was repaired on 2/14.   Found unresponsive on 2/15 am, with ongoing unresponsiveness and episodes of hypoxia.  An MRI of the brain showed extensive small infarcts in watershed and posterior vascular territory distribution. Palliative care spoke with family and she was made DNR/DNI and comfort care.     After review of the case by Dr. Gaitan and Dr. Shipley, it was felt that the infarcts alone were not extensive enough to cause the depressed level of consciousness. Since patient is at increased risk of seizures due to her previous insult and with the most recent infarcts as well, it was agreed upon by the multidisciplinary team and family that the possibility of non-convulsive status should be ruled out.  Per notes, family revoked comfort care status and opted for DNR with a trial of intubation, and upgraded back tto the ICU, where she was intubated for airway protection.  She was accepted as a transfer to [facility] ICU for close neuro monitoring and to r/o seizure.  No seizures noted on vEEG.  Patient extubated on 2/18 and downgraded to medicine.    Palliative care consulted for ongoing goals of care.    Patient seen and examined at the bedside.  Limited information obtained from patient.  Patient shakes head to communicate few, simple answers.  Denies pain, shortness of breath, nausea.  Unable to obtain further information from patient.    Patient's son, Geoff, at bedside, at the time of interview and exam.  His sister (patient's daughter, Meghan), joins pt and Geoff at bedside, later in the visit.    Son, Geoff, affirms history [place] [facility] [place] to [facility].  Offers that patient was found at home, as patient's daughter, Meghan and patient spend part of every day together, and are in touch with one another over the course of the day.  When patient could not be reached via phone, Meghan went over to the patient's house and found her on the floor with unclear speech, prompting presentation at [facility].  He affirms course at [facility].  Reports that her mental status was back to its baseline on 2/14/24, but then notes that sharing that the patient has not been responsive since surthere was no vEEG available at [facility], resulting in her transfer here to [facility].  ere are were no seizures found on EEG, that the patient was able to be extubated yesterday.  Geoff reports that consistent with preferences documented in her living will, DNR/I orders are in place.    Geoff shares that patient is       PERTINENT PMH REVIEWED: Yes     PAST MEDICAL & SURGICAL HISTORY:  Seizure      Stroke      Hyperlipidemia      H/O craniotomy      History of hip surgery          SOCIAL HISTORY:                                     Admitted from:  home  SNF  [facility]     Surrogate/HCP/Guardian: Phone#:    FAMILY HISTORY:      Baseline ADLs (prior to admission):  Independent/ Dependent      Allergies    No Known Allergies    Intolerances        Present Symptoms:     Dyspnea: 0 1 2 3   Nausea/Vomiting: Yes No  Anxiety:  Yes No  Depression: Yes No  Fatigue: Yes No  Loss of appetite: Yes No    Pain:             Character-            Duration-            Effect-            Factors-            Frequency-            Location-            Severity-    Review of Systems: Reviewed                     Negative:                     Positive:  Unable to obtain due to poor mentation   All others negative    MEDICATIONS  (STANDING):  aspirin  chewable 81 milliGRAM(s) Oral daily  atorvastatin 40 milliGRAM(s) Oral at bedtime  heparin   Injectable 5000 Unit(s) SubCutaneous every 8 hours  levETIRAcetam   Injectable 1000 milliGRAM(s) IV Push every 12 hours  multiple electrolytes Injection Type 1 1000 milliLiter(s) (75 mL/Hr) IV Continuous <Continuous>  pantoprazole  Injectable 40 milliGRAM(s) IV Push daily  piperacillin/tazobactam IVPB.. 3.375 Gram(s) IV Intermittent every 8 hours  polyethylene glycol 3350 17 Gram(s) Oral every 12 hours  potassium chloride  10 mEq/100 mL IVPB 10 milliEquivalent(s) IV Intermittent every 1 hour  senna 2 Tablet(s) Oral at bedtime    MEDICATIONS  (PRN):  acetaminophen     Tablet .. 650 milliGRAM(s) Oral every 6 hours PRN Mild Pain (1 - 3)      PHYSICAL EXAM:    Vital Signs Last 24 Hrs  T(C): 36.6 (19 Feb 2024 07:44), Max: 37.4 (18 Feb 2024 11:00)  T(F): 97.9 (19 Feb 2024 07:44), Max: 99.3 (18 Feb 2024 11:00)  HR: 74 (19 Feb 2024 07:44) (74 - 110)  BP: 106/66 (19 Feb 2024 07:44) (106/66 - 154/82)  BP(mean): 102 (18 Feb 2024 18:00) (91 - 103)  RR: 18 (19 Feb 2024 07:44) (17 - 28)  SpO2: 98% (19 Feb 2024 07:44) (89% - 100%)    Parameters below as of 19 Feb 2024 07:44  Patient On (Oxygen Delivery Method): nasal cannula        General: alert  oriented x ____ lethargic agitated                  cachexia  nonverbal  coma    Karnofsky:  %    HEENT: normal  dry mouth  ET tube/trach    Lungs: comfortable tachypnea/labored breathing  excessive secretions    CV: normal  tachycardia    GI: normal  distended  tender  no BS               PEG/NG/OG tube  constipation  last BM:     : normal  incontinent  oliguria/anuria  lugo    MSK: normal  weakness  edema             ambulatory  bedbound/wheelchair bound    Skin: normal  pressure ulcers- Stage_____  no rash    LABS:                        9.0    8.42  )-----------( 211      ( 19 Feb 2024 05:12 )             25.8     02-19    143  |  102  |  16.3  ----------------------------<  148<H>  3.1<L>   |  26.0  |  0.46<L>    Ca    8.0<L>      19 Feb 2024 05:12  Phos  3.3     02-19  Mg     2.0     02-19    TPro  5.2<L>  /  Alb  2.9<L>  /  TBili  1.8  /  DBili  0.3  /  AST  29  /  ALT  14  /  AlkPhos  56  02-19    PT/INR - ( 17 Feb 2024 17:20 )   PT: 12.9 sec;   INR: 1.17 ratio           Urinalysis Basic - ( 19 Feb 2024 05:12 )    Color: x / Appearance: x / SG: x / pH: x  Gluc: 148 mg/dL / Ketone: x  / Bili: x / Urobili: x   Blood: x / Protein: x / Nitrite: x   Leuk Esterase: x / RBC: x / WBC x   Sq Epi: x / Non Sq Epi: x / Bacteria: x      I&O's Summary    18 Feb 2024 07:01  -  19 Feb 2024 07:00  --------------------------------------------------------  IN: 1831.4 mL / OUT: 2085 mL / NET: -253.6 mL        RADIOLOGY & ADDITIONAL STUDIES:    ADVANCE DIRECTIVES:   DNR YES NO  Completed on:                     MOLST  YES NO   Completed on:  Living Will  YES NO   Completed on:           HPI:  78 year old female, otherwise independent and functional with a prior history of stroke 7 years ago, left-sided craniotomy for an abscess 3 years ago, seizures (on Keppra since 2021) presented the emergency room on 2/13/2024 after having being found on the floor after approx 10 hours. At the time, she was confused, had impaired speech and reportedly somewhat hypoxic with PO2 of 72. She underwent a full trauma workup with unremarkable CT brain and CT angiogram of the brain. However she was found to have R IT hip fracture, which was repaired on 2/14.     On 2/15 am she was found to be unresponsive and has since remained unresponsive with episodes of hypoxia. MRI of the brain which showed extensive small infarcts in watershed and posterior vascular territory distribution. Palliative care discussed with family and she was initially made DNR/DNI and comfort care.     After review of the case by Dr. Gaitan and Dr. Shipley, it was felt that the infarcts alone were not extensive enough to cause the depressed level of consciousness. Since patient is at increased risk of seizures due to her previous insult and with the most recent infarcts as well, it was agreed upon by the multidisciplinary team and family that the   possibility of non-convulsive status should at least be ruled out.     Family revoked comfort care status and opted for DNR with a trial of intubation. Patient was upgraded back to ICU after receiving dose of keppra and versed. Given   her tenuous respiratory status and state of unresponsiveness, patient was intubated for airway protection. She is accepted transfer to [facility] ICU for eeg monitoring    (17 Feb 2024 15:08)    HPI:  78 year old female, functional and independent at baseline, with a history of a CVA, 7 years ago, left-sided craniotomy for an abscess 3 years ago, seizures (on Keppra since 2021), presents to [facility] as a transfer from [facility].  Per admission note, patient presented to [facility] on 2/13/24, after being found on the floor for approximately 10 hrs.  On presentation, patient reported to be confused, with impaired speech, and reportedly somewhat hypoxic.  At the time, imaging of the brain was unrevealing, however, patient was found to have a R IT hip fracture, which was repaired on 2/14.   Found unresponsive on 2/15 am, with ongoing unresponsiveness and episodes of hypoxia.  An MRI of the brain showed extensive small infarcts in watershed and posterior vascular territory distribution. Palliative care spoke with family and she was made DNR/DNI and comfort care.     After review of the case by Dr. Gaitan and Dr. Shipley, it was felt that the infarcts alone were not extensive enough to cause the depressed level of consciousness. Since patient is at increased risk of seizures due to her previous insult and with the most recent infarcts as well, it was agreed upon by the multidisciplinary team and family that the possibility of non-convulsive status should be ruled out.  Per notes, family revoked comfort care status and opted for DNR with a trial of intubation, and the patient was upgraded to the ICU, where she was intubated for airway protection.  She was accepted as a transfer to [facility] ICU for close neuro monitoring and to r/o seizure.  No seizures noted on vEEG.  Patient extubated on 2/18 and downgraded to medicine.    Palliative care consulted for ongoing goals of care.    Patient seen and examined at the bedside.  Limited information obtained from patient.  Patient shakes head to communicate few, simple answers.  Denies pain, shortness of breath, nausea.  Unable to obtain further information from patient.    Patient's son, Geoff, at bedside, at the time of interview and exam.  His sister (patient's daughter, Meghan), joins pt and Geoff at bedside, later in the visit.    Son, Geoff, affirms history prompting presentation at [facility] and subsequently here to [facility].  Offers that patient was found at home, as patient and her daughter, Meghan, spend part of every day together, and are in touch with one another over the course of the day.  When patient could not be reached via phone, Meghan went over to the patient's house and found her on the floor with unclear speech, prompting presentation at [facility].  He affirms course at [facility].  Reports that her mental status was back to its baseline on 2/14/24, after it being altered on 2/13/24, but then notes that the patient has not been the same since surgery on 2/14/24.  Reports that pt was made comfort care at [facility], but following case review, and as there was no vEEG available at [facility], she was transferred to [facility] to r/o seizures.  He reports that there were no seizures found on EEG, and that the patient was able to be extubated yesterday.  He reports that patient never had seizures but was started on Keppra prophylactically, with the brain surgery that she had. Geoff reports that consistent with preferences documented in her living will, DNR/I orders are in place.       PERTINENT PMH REVIEWED: Yes     PAST MEDICAL & SURGICAL HISTORY:  Seizure      Stroke      Hyperlipidemia      H/O craniotomy      History of hip surgery          SOCIAL HISTORY:  from home, resides alone.  : three children: GeoffMeghan Raymond                                   Surrogate/HCP/Guardian: Geoff Wise Phone#: [phone]    FAMILY HISTORY:  unable to obtain      Baseline ADLs (prior to admission): independent w/ADLs, some assist with IADLs.      Allergies    No Known Allergies    Intolerances        Present Symptoms: as per HPI; unable to obtain additional information 2/2 mental status    Review of Systems: Reviewed, as per HPI, otherwise unable to obtain due to poor mentation   All others negative    MEDICATIONS  (STANDING):  aspirin  chewable 81 milliGRAM(s) Oral daily  atorvastatin 40 milliGRAM(s) Oral at bedtime  heparin   Injectable 5000 Unit(s) SubCutaneous every 8 hours  levETIRAcetam   Injectable 1000 milliGRAM(s) IV Push every 12 hours  multiple electrolytes Injection Type 1 1000 milliLiter(s) (75 mL/Hr) IV Continuous <Continuous>  pantoprazole  Injectable 40 milliGRAM(s) IV Push daily  piperacillin/tazobactam IVPB.. 3.375 Gram(s) IV Intermittent every 8 hours  polyethylene glycol 3350 17 Gram(s) Oral every 12 hours  potassium chloride  10 mEq/100 mL IVPB 10 milliEquivalent(s) IV Intermittent every 1 hour  senna 2 Tablet(s) Oral at bedtime    MEDICATIONS  (PRN):  acetaminophen     Tablet .. 650 milliGRAM(s) Oral every 6 hours PRN Mild Pain (1 - 3)      PHYSICAL EXAM:    Vital Signs Last 24 Hrs  T(C): 36.6 (19 Feb 2024 07:44), Max: 37.4 (18 Feb 2024 11:00)  T(F): 97.9 (19 Feb 2024 07:44), Max: 99.3 (18 Feb 2024 11:00)  HR: 74 (19 Feb 2024 07:44) (74 - 110)  BP: 106/66 (19 Feb 2024 07:44) (106/66 - 154/82)  BP(mean): 102 (18 Feb 2024 18:00) (91 - 103)  RR: 18 (19 Feb 2024 07:44) (17 - 28)  SpO2: 98% (19 Feb 2024 07:44) (89% - 100%)    Parameters below as of 19 Feb 2024 07:44  Patient On (Oxygen Delivery Method): nasal cannula    Karnofsky: 30  %    Gen: In NAD.  No pain behaviors or work of breathing noted  Neuro: intermittently provides answers to few, simple questions non-verbally  Head: NC/AT  Eyes: sclerae non-icteric  ENT: NGT  Resp: unlabored  CV: S1, S2  Abd: soft, non-tender, + bowels sounds  Peripheral Vasc: warm  Int: warm and dry  Psych: no psychomotor agitation    LABS:                        9.0    8.42  )-----------( 211      ( 19 Feb 2024 05:12 )             25.8     02-19    143  |  102  |  16.3  ----------------------------<  148<H>  3.1<L>   |  26.0  |  0.46<L>    Ca    8.0<L>      19 Feb 2024 05:12  Phos  3.3     02-19  Mg     2.0     02-19    TPro  5.2<L>  /  Alb  2.9<L>  /  TBili  1.8  /  DBili  0.3  /  AST  29  /  ALT  14  /  AlkPhos  56  02-19    PT/INR - ( 17 Feb 2024 17:20 )   PT: 12.9 sec;   INR: 1.17 ratio           Urinalysis Basic - ( 19 Feb 2024 05:12 )    Color: x / Appearance: x / SG: x / pH: x  Gluc: 148 mg/dL / Ketone: x  / Bili: x / Urobili: x   Blood: x / Protein: x / Nitrite: x   Leuk Esterase: x / RBC: x / WBC x   Sq Epi: x / Non Sq Epi: x / Bacteria: x      I&O's Summary    18 Feb 2024 07:01  -  19 Feb 2024 07:00  --------------------------------------------------------  IN: 1831.4 mL / OUT: 2085 mL / NET: -253.6 mL        RADIOLOGY & ADDITIONAL STUDIES:    ADVANCE DIRECTIVES:   DNR YES NO  Completed on:                     MOLST  YES NO   Completed on:  Living Will  YES NO   Completed on:           HPI:  78 year old female, otherwise independent and functional with a prior history of stroke 7 years ago, left-sided craniotomy for an abscess 3 years ago, seizures (on Keppra since ) presented the emergency room on 2024 after having being found on the floor after approx 10 hours. At the time, she was confused, had impaired speech and reportedly somewhat hypoxic with PO2 of 72. She underwent a full trauma workup with unremarkable CT brain and CT angiogram of the brain. However she was found to have R IT hip fracture, which was repaired on .     On 2/15 am she was found to be unresponsive and has since remained unresponsive with episodes of hypoxia. MRI of the brain which showed extensive small infarcts in watershed and posterior vascular territory distribution. Palliative care discussed with family and she was initially made DNR/DNI and comfort care.     After review of the case by Dr. Gaitan and Dr. Shipley, it was felt that the infarcts alone were not extensive enough to cause the depressed level of consciousness. Since patient is at increased risk of seizures due to her previous insult and with the most recent infarcts as well, it was agreed upon by the multidisciplinary team and family that the   possibility of non-convulsive status should at least be ruled out.     Family revoked comfort care status and opted for DNR with a trial of intubation. Patient was upgraded back to ICU after receiving dose of keppra and versed. Given   her tenuous respiratory status and state of unresponsiveness, patient was intubated for airway protection. She is accepted transfer to [facility] ICU for eeg monitoring    (2024 15:08)    HPI:  78 year old female, functional and independent at baseline, with a history of a CVA, 7 years ago, left-sided craniotomy for an abscess 3 years ago, seizures (on Keppra since ), presents to [facility] as a [place] [facility]  Per admission note, patient presented to [facility] on 24, after being found on the floor for approximately 10 hrs.  On presentation, patient reported to be confused, with impaired speech, and reportedly somewhat hypoxic.  At the time, imaging of the brain was unrevealing, however, patient was found to have a R IT hip fracture, which was repaired on .   Found unresponsive on 2/15 am, with ongoing unresponsiveness and episodes of hypoxia.  An MRI of the brain showed extensive small infarcts in watershed and posterior vascular territory distribution. Palliative care spoke with family and she was made DNR/DNI and comfort care.     After review of the case by Dr. Gaitan and Dr. Shipley, it was felt that the infarcts alone were not extensive enough to cause the depressed level of consciousness. Since patient is at increased risk of seizures due to her previous insult and with the most recent infarcts as well, it was agreed upon by the multidisciplinary team and family that the possibility of non-convulsive status should be ruled out.  Per notes, family revoked comfort care status and opted for DNR with a trial of intubation, and the patient was upgraded to the ICU, where she was intubated for airway protection.  She was accepted as a transfer to [facility] ICU for close neuro monitoring and to r/o seizure.  No seizures noted on vEEG.  Patient extubated on  and downgraded to medicine.    Palliative care consulted for ongoing goals of care.    Patient seen and examined at the bedside.  Limited information obtained from patient.  Patient shakes head to communicate few, simple answers.  Denies pain, shortness of breath, nausea.  Unable to obtain further information from patient.    Patient's son, Geoff, at bedside, at the time of interview and exam.  His sister (patient's daughter, Meghan), joins pt and Geoff at bedside, later in the visit.    Son, Geoff, affirms history prompting presentation at [facility] and subsequently here to [facility].  Offers that patient was found at home, as patient and her daughter, Meghan, spend part of every day together, and are in touch with one another over the course of the day.  When patient could not be reached via phone, Meghan went over to the patient's house and found her on the floor with unclear speech, prompting presentation at [facility].  He affirms course at [facility].  Reports that her mental status was back to its baseline on 24, after it being altered on 24, but then notes that the patient has not been the same since surgery on 24.  Reports that pt was made comfort care at [facility], but following case review, and as there was no vEEG available at [facility], she was transferred to [facility] to r/o seizures.  He reports that there were no seizures found on EEG, and that the patient was able to be extubated yesterday.  He reports that patient never had seizures but was started on Keppra prophylactically, with the brain surgery that she had. Geoff reports that consistent with preferences documented in her living will, DNR/I orders are in place.       PERTINENT PMH REVIEWED: Yes     PAST MEDICAL & SURGICAL HISTORY:  Seizure      Stroke      Hyperlipidemia      H/O craniotomy      History of hip surgery          SOCIAL HISTORY:  from home, resides alone.  : three children: GeoffMeghan Raymond                                   Surrogate/HCP/Guardian: Geoff Wise Phone#: [phone]    FAMILY HISTORY:  unable to obtain      Baseline ADLs (prior to admission): independent w/ADLs, some assist with IADLs.      Allergies    No Known Allergies    Intolerances        Present Symptoms: as per HPI; unable to obtain additional information 2/2 mental status    Review of Systems: Reviewed, as per HPI, otherwise unable to obtain due to poor mentation   All others negative    MEDICATIONS  (STANDING):  aspirin  chewable 81 milliGRAM(s) Oral daily  atorvastatin 40 milliGRAM(s) Oral at bedtime  heparin   Injectable 5000 Unit(s) SubCutaneous every 8 hours  levETIRAcetam   Injectable 1000 milliGRAM(s) IV Push every 12 hours  multiple electrolytes Injection Type 1 1000 milliLiter(s) (75 mL/Hr) IV Continuous <Continuous>  pantoprazole  Injectable 40 milliGRAM(s) IV Push daily  piperacillin/tazobactam IVPB.. 3.375 Gram(s) IV Intermittent every 8 hours  polyethylene glycol 3350 17 Gram(s) Oral every 12 hours  potassium chloride  10 mEq/100 mL IVPB 10 milliEquivalent(s) IV Intermittent every 1 hour  senna 2 Tablet(s) Oral at bedtime    MEDICATIONS  (PRN):  acetaminophen     Tablet .. 650 milliGRAM(s) Oral every 6 hours PRN Mild Pain (1 - 3)      PHYSICAL EXAM:    Vital Signs Last 24 Hrs  T(C): 36.6 (2024 07:44), Max: 37.4 (2024 11:00)  T(F): 97.9 (2024 07:44), Max: 99.3 (2024 11:00)  HR: 74 (2024 07:44) (74 - 110)  BP: 106/66 (2024 07:44) (106/66 - 154/82)  BP(mean): 102 (2024 18:00) (91 - 103)  RR: 18 (2024 07:44) (17 - 28)  SpO2: 98% (2024 07:44) (89% - 100%)    Parameters below as of 2024 07:44  Patient On (Oxygen Delivery Method): nasal cannula    Karnofsky: 30  %    Gen: In NAD.  No pain behaviors or work of breathing noted  Neuro: intermittently provides answers to few, simple questions non-verbally  Head: NC/AT  Eyes: sclerae non-icteric  ENT: NGT  Resp: unlabored  CV: S1, S2  Abd: soft, non-tender, + bowels sounds  Peripheral Vasc: warm  Int: warm and dry  Psych: no psychomotor agitation    LABS:                        9.0    8.42  )-----------( 211      ( 2024 05:12 )             25.8     02-    143  |  102  |  16.3  ----------------------------<  148<H>  3.1<L>   |  26.0  |  0.46<L>    Ca    8.0<L>      2024 05:12  Phos  3.3     -  Mg     2.0         TPro  5.2<L>  /  Alb  2.9<L>  /  TBili  1.8  /  DBili  0.3  /  AST  29  /  ALT  14  /  AlkPhos  56  02-19    PT/INR - ( 2024 17:20 )   PT: 12.9 sec;   INR: 1.17 ratio           Urinalysis Basic - ( 2024 05:12 )    Color: x / Appearance: x / SG: x / pH: x  Gluc: 148 mg/dL / Ketone: x  / Bili: x / Urobili: x   Blood: x / Protein: x / Nitrite: x   Leuk Esterase: x / RBC: x / WBC x   Sq Epi: x / Non Sq Epi: x / Bacteria: x      I&O's Summary    2024 07:01  -  2024 07:00  --------------------------------------------------------  IN: 1831.4 mL / OUT: 2085 mL / NET: -253.6 mL        RADIOLOGY & ADDITIONAL STUDIES:    ADVANCE DIRECTIVES:   DNR/I  HCP: primary, spouse, Gomez (), alt: Geoff Wise

## 2024-02-19 NOTE — CONSULT NOTE ADULT - ASSESSMENT
The patient is a 78y Female with what appears to be embolic shower of infarcts in the bilateral cerebral hemispheres and posterior fossa.  She remains unresponsive.     Unresponsiveness.   No active seizures on EEG although potential exists.   Agree with continuing Keppra.   Likely secondary to shower of infarcts.   Continue antiplatelet and statin for now.     Palliative care following.   If family elects to pursue comfort measures, would consider returning to [facility].    Case discussed with Dr Longoria.

## 2024-02-19 NOTE — SWALLOW BEDSIDE ASSESSMENT ADULT - ORAL PREPARATORY PHASE
pt closing lips to presentation of puree (minimal) but no lingual movement or anterior-posterior transit of bolus

## 2024-02-19 NOTE — SWALLOW BEDSIDE ASSESSMENT ADULT - SWALLOW EVAL: DIAGNOSIS
Severe oral dysphagia. Pt closing lips in response to puree presentation on lips; however no lingual movement observed with no anterior-posterior movement of bolus. Puree removed by therapist. Unable to assess pharyngeal stage due to severity of oral stage deficits at this time.

## 2024-02-19 NOTE — SWALLOW BEDSIDE ASSESSMENT ADULT - SLP PERTINENT HISTORY OF CURRENT PROBLEM
Per chart: 78 year old female, otherwise independent and functional with a prior history of stroke 7 years ago, left-sided craniotomy for an abscess 3 years ago, seizures (on Keppra since 2021) presented the emergency room on 2/13/2024 after having being found on the floor after approx 10 hours. At the time, she was confused, had impaired speech and reportedly somewhat hypoxic with PO2 of 72. She underwent a full trauma workup with unremarkable CT brain and CT angiogram of the brain. Found to have R IT hip fracture, which was repaired on 2/14. On 2/15 am she was found to be unresponsive and has since remained unresponsive with episodes of hypoxia. MRI of the brain which showed extensive small infarcts in watershed and posterior vascular territory distribution. Palliative care discussed with family and she was initially made DNR/DNI and comfort care. After review of the case it was felt that the infarcts alone were not extensive enough to cause the depressed level of consciousness.
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
## 2024-02-20 ENCOUNTER — TRANSCRIPTION ENCOUNTER (OUTPATIENT)
Age: 79
End: 2024-02-20

## 2024-02-20 VITALS
RESPIRATION RATE: 18 BRPM | SYSTOLIC BLOOD PRESSURE: 118 MMHG | TEMPERATURE: 97 F | HEART RATE: 82 BPM | OXYGEN SATURATION: 97 % | DIASTOLIC BLOOD PRESSURE: 77 MMHG

## 2024-02-20 LAB
ANION GAP SERPL CALC-SCNC: 10 MMOL/L — SIGNIFICANT CHANGE UP (ref 5–17)
BASOPHILS # BLD AUTO: 0.01 K/UL — SIGNIFICANT CHANGE UP (ref 0–0.2)
BASOPHILS NFR BLD AUTO: 0.1 % — SIGNIFICANT CHANGE UP (ref 0–2)
BUN SERPL-MCNC: 20.9 MG/DL — HIGH (ref 8–20)
CALCIUM SERPL-MCNC: 8 MG/DL — LOW (ref 8.4–10.5)
CHLORIDE SERPL-SCNC: 105 MMOL/L — SIGNIFICANT CHANGE UP (ref 96–108)
CO2 SERPL-SCNC: 28 MMOL/L — SIGNIFICANT CHANGE UP (ref 22–29)
CREAT SERPL-MCNC: 0.49 MG/DL — LOW (ref 0.5–1.3)
EGFR: 95 ML/MIN/1.73M2 — SIGNIFICANT CHANGE UP
EOSINOPHIL # BLD AUTO: 0.1 K/UL — SIGNIFICANT CHANGE UP (ref 0–0.5)
EOSINOPHIL NFR BLD AUTO: 1.1 % — SIGNIFICANT CHANGE UP (ref 0–6)
GLUCOSE SERPL-MCNC: 217 MG/DL — HIGH (ref 70–99)
HCT VFR BLD CALC: 25.9 % — LOW (ref 34.5–45)
HGB BLD-MCNC: 8.9 G/DL — LOW (ref 11.5–15.5)
IMM GRANULOCYTES NFR BLD AUTO: 0.7 % — SIGNIFICANT CHANGE UP (ref 0–0.9)
LYMPHOCYTES # BLD AUTO: 1.14 K/UL — SIGNIFICANT CHANGE UP (ref 1–3.3)
LYMPHOCYTES # BLD AUTO: 12.1 % — LOW (ref 13–44)
MAGNESIUM SERPL-MCNC: 2.1 MG/DL — SIGNIFICANT CHANGE UP (ref 1.8–2.6)
MCHC RBC-ENTMCNC: 33.1 PG — SIGNIFICANT CHANGE UP (ref 27–34)
MCHC RBC-ENTMCNC: 34.4 GM/DL — SIGNIFICANT CHANGE UP (ref 32–36)
MCV RBC AUTO: 96.3 FL — SIGNIFICANT CHANGE UP (ref 80–100)
MONOCYTES # BLD AUTO: 0.72 K/UL — SIGNIFICANT CHANGE UP (ref 0–0.9)
MONOCYTES NFR BLD AUTO: 7.6 % — SIGNIFICANT CHANGE UP (ref 2–14)
NEUTROPHILS # BLD AUTO: 7.39 K/UL — SIGNIFICANT CHANGE UP (ref 1.8–7.4)
NEUTROPHILS NFR BLD AUTO: 78.4 % — HIGH (ref 43–77)
PLATELET # BLD AUTO: 239 K/UL — SIGNIFICANT CHANGE UP (ref 150–400)
POTASSIUM SERPL-MCNC: 3.7 MMOL/L — SIGNIFICANT CHANGE UP (ref 3.5–5.3)
POTASSIUM SERPL-SCNC: 3.7 MMOL/L — SIGNIFICANT CHANGE UP (ref 3.5–5.3)
RBC # BLD: 2.69 M/UL — LOW (ref 3.8–5.2)
RBC # FLD: 13 % — SIGNIFICANT CHANGE UP (ref 10.3–14.5)
SODIUM SERPL-SCNC: 142 MMOL/L — SIGNIFICANT CHANGE UP (ref 135–145)
WBC # BLD: 9.43 K/UL — SIGNIFICANT CHANGE UP (ref 3.8–10.5)
WBC # FLD AUTO: 9.43 K/UL — SIGNIFICANT CHANGE UP (ref 3.8–10.5)

## 2024-02-20 PROCEDURE — 99233 SBSQ HOSP IP/OBS HIGH 50: CPT

## 2024-02-20 PROCEDURE — 71045 X-RAY EXAM CHEST 1 VIEW: CPT | Mod: 26

## 2024-02-20 RX ORDER — LEVETIRACETAM 250 MG/1
10 TABLET, FILM COATED ORAL
Qty: 0 | Refills: 0 | DISCHARGE
Start: 2024-02-20

## 2024-02-20 RX ORDER — DOXAZOSIN MESYLATE 4 MG
1 TABLET ORAL
Qty: 0 | Refills: 0 | DISCHARGE
Start: 2024-02-20

## 2024-02-20 RX ORDER — ROBINUL 0.2 MG/ML
0.4 INJECTION INTRAMUSCULAR; INTRAVENOUS
Refills: 0 | Status: DISCONTINUED | OUTPATIENT
Start: 2024-02-20 | End: 2024-02-22

## 2024-02-20 RX ORDER — ROBINUL 0.2 MG/ML
0.4 INJECTION INTRAMUSCULAR; INTRAVENOUS
Qty: 0 | Refills: 0 | DISCHARGE
Start: 2024-02-20

## 2024-02-20 RX ORDER — MORPHINE SULFATE 50 MG/1
2 CAPSULE, EXTENDED RELEASE ORAL
Refills: 0 | Status: DISCONTINUED | OUTPATIENT
Start: 2024-02-20 | End: 2024-02-22

## 2024-02-20 RX ORDER — DOXAZOSIN MESYLATE 4 MG
1 TABLET ORAL AT BEDTIME
Refills: 0 | Status: DISCONTINUED | OUTPATIENT
Start: 2024-02-20 | End: 2024-02-21

## 2024-02-20 RX ORDER — PANTOPRAZOLE SODIUM 20 MG/1
40 TABLET, DELAYED RELEASE ORAL
Qty: 0 | Refills: 0 | DISCHARGE
Start: 2024-02-20

## 2024-02-20 RX ORDER — MORPHINE SULFATE 50 MG/1
2 CAPSULE, EXTENDED RELEASE ORAL
Qty: 0 | Refills: 0 | DISCHARGE
Start: 2024-02-20

## 2024-02-20 RX ADMIN — ROBINUL 0.4 MILLIGRAM(S): 0.2 INJECTION INTRAMUSCULAR; INTRAVENOUS at 14:48

## 2024-02-20 RX ADMIN — HEPARIN SODIUM 5000 UNIT(S): 5000 INJECTION INTRAVENOUS; SUBCUTANEOUS at 05:02

## 2024-02-20 RX ADMIN — Medication 650 MILLIGRAM(S): at 06:30

## 2024-02-20 RX ADMIN — LEVETIRACETAM 1000 MILLIGRAM(S): 250 TABLET, FILM COATED ORAL at 05:02

## 2024-02-20 RX ADMIN — PIPERACILLIN AND TAZOBACTAM 25 GRAM(S): 4; .5 INJECTION, POWDER, LYOPHILIZED, FOR SOLUTION INTRAVENOUS at 05:02

## 2024-02-20 RX ADMIN — Medication 650 MILLIGRAM(S): at 05:02

## 2024-02-20 NOTE — GOALS OF CARE CONVERSATION - ADVANCED CARE PLANNING - NS PRO AD PATIENT TYPE
Medical Orders for Life-Sustaining Treatment (MOLST)/Living Will
Medical Orders for Life-Sustaining Treatment (MOLST)/Living Will

## 2024-02-20 NOTE — PROGRESS NOTE ADULT - ASSESSMENT
78y/oF PMH CVA (7yrs ago), L-sided craniotomy for abscess 3 yrs ago, hx seizures (on Keppra since 2021) presenting to [facility] ER 2/13 after found down for ~10hrs. Pt with reported confusion, impaired speech and hypoxia to spO2 72. CTH and CTA head, neck, perfusion without acute findings. Found to have R IT hip fx, repaired 2/14. on 2/15, pt found to be unresponsive with intermittent hypoxia. MRI brain with extensive small infarcts in watershed and posterior vascular territory distribution. Seen by Palliative care, initially transitioned to comfort care and DNR/DNI code status. After review of the care by Dr. Gaitan and Dr. Shipley, it was felt that the infarcts alone were not extensive enough to cause the depressed level of consciousness and that non-convulsive status should be ruled out. Family revoked comfort care status, pt upgraded back to ICU after receiving dose Keppra and Versed. Pt intubated for airway protection. Transferred to [facility] 2/17 under NSICU care for q1h neurochecks and r/o seizure. Pt continued on Keppra 1000mg BID. cvEEG without seizures. Extubated 2/18 to NC. Mental status/neuro exam improving. Downgraded to floor 2/18. Pt now transitioned to comfort care measures 2/20      metabolic encephalopathy 2/2   acute CVAs   status epilepticus ruled out   hx seizure disorder   rule out infection  -s/p NSICU   -CTH, CTA head/neck reviewed (HIE)   -MRI brain 2/15 reviewed (HIE): numerous areas acute infarcts seen throughout posterior fossa and supratentorial regions   -cvEEG without seizures   -cont asa, statin   -extubated 2/18 (intubated prior to transfer for airway protection)   -fall precautions  -seizure precautions   -aspiration precautions   -cont neurochecks   -neuro recs appreciated  -cont Keppra  -hgba1c 5.6   -LDL 46  -cultures ngtd  -CXR reviewed  -UA reviewed   -SLP rec NPO 2/19    R hip fx   -s/p R hip fixation (2/14) at [facility]   -as per ortho note WBAT   -f/u PT/OT     Hypokalemia   -repleted   -f/u am bmp    Anemia  suspect some postop blood loss   -monitor for s/sx active bleeding       MOLST and living will in chart , reviewed     Palliative following       pt now comfort care measures; see C note 2/20   hospice consult placed

## 2024-02-20 NOTE — DISCHARGE NOTE PROVIDER - NSDCCPCAREPLAN_GEN_ALL_CORE_FT
PRINCIPAL DISCHARGE DIAGNOSIS  Diagnosis: CVA (cerebrovascular accident)  Assessment and Plan of Treatment:       SECONDARY DISCHARGE DIAGNOSES  Diagnosis: History of seizure  Assessment and Plan of Treatment:

## 2024-02-20 NOTE — DISCHARGE NOTE PROVIDER - NSDCMRMEDTOKEN_GEN_ALL_CORE_FT
bisacodyl 10 mg rectal suppository: 1 suppository(ies) rectal once a day As needed Constipation  doxazosin 1 mg oral tablet: 1 tab(s) orally once a day (at bedtime)  glycopyrrolate 0.2 mg/mL injectable solution: 0.4 milligram(s) injectable 4 times a day  levETIRAcetam 100 mg/mL intravenous solution: 10 milliliter(s) intravenous every 12 hours  LORazepam 1 mg/mL-NaCl 0.9% intravenous solution: 0.5 milliliter(s) intravenous every 4 hours As needed Anxiety  morphine: 2 milligram(s) intravenous every 4 hours  ocular lubricant ophthalmic solution: 2 drop(s) to each affected eye every hour As needed Dry Eyes  pantoprazole 40 mg intravenous injection: 40 milligram(s) intravenous once a day   acetaminophen 10 mg/mL intravenous solution: 60 milliliter(s) intravenous once  bisacodyl 10 mg rectal suppository: 1 suppository(ies) rectal once a day As needed Constipation  doxazosin 1 mg oral tablet: 1 tab(s) orally once a day (at bedtime)  glycopyrrolate 0.2 mg/mL injectable solution: 0.4 milligram(s) injectable 4 times a day  levETIRAcetam 100 mg/mL intravenous solution: 10 milliliter(s) intravenous every 12 hours  LORazepam 1 mg/mL-NaCl 0.9% intravenous solution: 0.5 milliliter(s) intravenous every 4 hours As needed Anxiety  morphine: 2 milligram(s) intravenous every 4 hours  ocular lubricant ophthalmic solution: 2 drop(s) to each affected eye every hour As needed Dry Eyes  pantoprazole 40 mg intravenous injection: 40 milligram(s) intravenous once a day

## 2024-02-20 NOTE — GOALS OF CARE CONVERSATION - ADVANCED CARE PLANNING - TREATMENT GUIDELINES
DNR/Comfort measures only/No blood draws/No artificial nutrition/No antibiotics/No IV fluids/DNI
DNR/DNI

## 2024-02-20 NOTE — DISCHARGE NOTE PROVIDER - ATTENDING DISCHARGE PHYSICAL EXAMINATION:
Vital Signs Last 24 Hrs  T(C): 36.8 (20 Feb 2024 07:55), Max: 37.4 (19 Feb 2024 20:24)  T(F): 98.3 (20 Feb 2024 07:55), Max: 99.3 (19 Feb 2024 20:24)  HR: 87 (20 Feb 2024 07:55) (79 - 87)  BP: 110/75 (20 Feb 2024 07:55) (110/75 - 119/76)  BP(mean): --  RR: 18 (20 Feb 2024 07:55) (17 - 23)  SpO2: 97% (20 Feb 2024 07:55) (96% - 98%)    Parameters below as of 20 Feb 2024 04:50  Patient On (Oxygen Delivery Method): nasal cannula  O2 Flow (L/min): 2    GENERAL: NAD  HEENT: opens eyes spontaneously; blinks to threat; +NGT in place   CHEST/LUNG: course sounds b/l, respirations unlabored   HEART: S1S2+, Regular rate and rhythm  ABDOMEN: Soft, Nontender, Nondistended; Bowel sounds present  SKIN: warm, dry   MSK: +R hip dressing c/d/i; no surrounding erythema or ecchymosis   NEURO: awake; following some commands; LUE/LLE movement on commands, slight R hand  Vital Signs Last 24 Hrs  T(C): --  T(F): --  HR: --  BP: --  BP(mean): --  RR: --  SpO2: --        GENERAL: NAD  HEENT: opens eyes spontaneously; blinks to threat; +NGT in place   CHEST/LUNG: course sounds b/l, respirations unlabored   HEART: S1S2+, Regular rate and rhythm  ABDOMEN: Soft, Nontender, Nondistended; Bowel sounds present  SKIN: warm, dry   MSK: +R hip dressing c/d/i; no surrounding erythema or ecchymosis   NEURO: awake; following some commands; LUE/LLE movement on commands, slight R hand  Vital Signs Last 24 Hrs  T(C): --  T(F): --  HR: --  BP: --  BP(mean): --  RR: --  SpO2: --        GENERAL: NAD  HEENT: opens eyes spontaneously  CHEST/LUNG: course sounds b/l, respirations unlabored   HEART: S1S2+, Regular rate and rhythm  ABDOMEN: Soft, Nontender, Nondistended; Bowel sounds present  SKIN: warm, dry   MSK: +R hip dressing c/d/i; no surrounding erythema or ecchymosis   NEURO: drowsy

## 2024-02-20 NOTE — PROGRESS NOTE ADULT - SUBJECTIVE AND OBJECTIVE BOX
LULY LUZ    [id number]    78y      Female    CC: ams    INTERVAL HPI/OVERNIGHT EVENTS: Pt seen and examined. no acute events reported o/n     REVIEW OF SYSTEMS:  unable to obtain     Vital Signs Last 24 Hrs  T(C): 36.8 (20 Feb 2024 07:55), Max: 37.4 (19 Feb 2024 20:24)  T(F): 98.3 (20 Feb 2024 07:55), Max: 99.3 (19 Feb 2024 20:24)  HR: 87 (20 Feb 2024 07:55) (79 - 90)  BP: 110/75 (20 Feb 2024 07:55) (110/75 - 130/78)  BP(mean): --  RR: 18 (20 Feb 2024 07:55) (17 - 23)  SpO2: 97% (20 Feb 2024 07:55) (96% - 98%)    Parameters below as of 20 Feb 2024 04:50  Patient On (Oxygen Delivery Method): nasal cannula  O2 Flow (L/min): 2      PHYSICAL EXAM:    GENERAL: NAD  HEENT: opens eyes spontaneously; blinks to threat; +NGT in place   CHEST/LUNG: course sounds b/l, respirations unlabored   HEART: S1S2+, Regular rate and rhythm  ABDOMEN: Soft, Nontender, Nondistended; Bowel sounds present  SKIN: warm, dry   MSK: +R hip dressing c/d/i; no surrounding erythema or ecchymosis   NEURO: awake; following some commands; LUE/LLE movement on commands, slight R hand      LABS:                        8.9    9.43  )-----------( 239      ( 20 Feb 2024 06:41 )             25.9     02-20    142  |  105  |  20.9<H>  ----------------------------<  217<H>  3.7   |  28.0  |  0.49<L>    Ca    8.0<L>      20 Feb 2024 06:41  Phos  3.3     02-19  Mg     2.1     02-20    TPro  5.2<L>  /  Alb  2.9<L>  /  TBili  1.8  /  DBili  0.3  /  AST  29  /  ALT  14  /  AlkPhos  56  02-19      Urinalysis Basic - ( 20 Feb 2024 06:41 )    Color: x / Appearance: x / SG: x / pH: x  Gluc: 217 mg/dL / Ketone: x  / Bili: x / Urobili: x   Blood: x / Protein: x / Nitrite: x   Leuk Esterase: x / RBC: x / WBC x   Sq Epi: x / Non Sq Epi: x / Bacteria: x          MEDICATIONS  (STANDING):  doxazosin 1 milliGRAM(s) Oral at bedtime  levETIRAcetam   Injectable 1000 milliGRAM(s) IV Push every 12 hours  pantoprazole  Injectable 40 milliGRAM(s) IV Push daily    MEDICATIONS  (PRN):  artificial  tears Solution 2 Drop(s) Both EYES every 1 hour PRN Dry Eyes  bisacodyl Suppository 10 milliGRAM(s) Rectal daily PRN Constipation  glycopyrrolate Injectable 0.4 milliGRAM(s) IV Push four times a day PRN Secretions  LORazepam   Injectable 0.5 milliGRAM(s) IV Push every 4 hours PRN Anxiety  morphine  - Injectable 2 milliGRAM(s) IV Push every 2 hours PRN Moderate pain (4-6), Severe pain (7-10), Respiratory rate greater than 22      RADIOLOGY & ADDITIONAL TESTS:

## 2024-02-20 NOTE — CHART NOTE - NSCHARTNOTEFT_GEN_A_CORE
Consult received for MST Score = />2. Upon chart review, pt does not currently meet criteria for protein calorie malnutrition risk per MST. RD aware pallative following for GOC. Spoke with pt daughter at pt bedside. Per GOC conversation pt and family have decided to remove TF and continue with hospice care. No current nutrition interventions at this time. RD remains available for assessment per protocol or as needed.

## 2024-02-20 NOTE — DISCHARGE NOTE PROVIDER - HOSPITAL COURSE
78y/oF PMH CVA (7yrs ago), L-sided craniotomy for abscess 3 yrs ago, hx seizures (on Keppra since 2021) presenting to [facility] [place] ER 2/13 after found down for ~10hrs. Pt with reported confusion, impaired speech and hypoxia to spO2 72. CTH and CTA head, neck, perfusion without acute findings. Found to have R IT hip fx, repaired 2/14. on 2/15, pt found to be unresponsive with intermittent hypoxia. MRI brain with extensive small infarcts in watershed and posterior vascular territory distribution. Seen by Palliative care, initially transitioned to comfort care and DNR/DNI code status. After review of the care by Dr. Gaitan and Dr. Shipley, it was felt that the infarcts alone were not extensive enough to cause the depressed level of consciousness and that non-convulsive status should be ruled out. Family revoked comfort care status, pt upgraded back to ICU after receiving dose Keppra and Versed. Pt intubated for airway protection. Transferred to [facility] 2/17 under NSICU care for q1h neurochecks and r/o seizure. Pt continued on Keppra 1000mg BID. cvEEG without seizures. Extubated 2/18 to NC. Mental status/neuro exam improving. Downgraded to floor 2/18. Pt now transitioned to comfort care measures 2/20 78y/oF PMH CVA (7yrs ago), L-sided craniotomy for abscess 3 yrs ago, hx seizures (on Keppra since 2021) presenting [place] [facility] [place] 2[place]13 after found down for ~10hrs. Pt with reported confusion, impaired speech and hypoxia to spO2 72. CTH and CTA head, neck, perfusion without acute findings. Found to have R IT hip fx, repaired 2/14. on 2/15, pt found to be unresponsive with intermittent hypoxia. MRI brain with extensive small infarcts in watershed and posterior vascular territory distribution. Seen by Palliative care, initially transitioned to comfort care and DNR/DNI code status. After review of the care by Dr. Gaitan and Dr. Shipley, it was felt that the infarcts alone were not extensive enough to cause the depressed level of consciousness and that non-convulsive status should be ruled out. Family revoked comfort care status, pt upgraded back to ICU after receiving dose Keppra and Versed. Pt intubated for airway protection. Transferred to [facility] 2/17 under NSICU care for q1h neurochecks and r/o seizure. Pt continued on Keppra 1000mg BID. cvEEG without seizures. Extubated 2/18 to NC. Mental status/neuro exam improving. Downgraded to floor 2/18. Pt transitioned to comfort care measures 2/20

## 2024-02-20 NOTE — GOALS OF CARE CONVERSATION - ADVANCED CARE PLANNING - CONVERSATION DETAILS
The patient is a 78y Female who presented to [facility] after being found on the floor. She was found to have right hip fracture. She underwent repair on 2/14/24 and did not awaken after surgery.   Brain MRI showed multiple acute infarcts throughout both cerebral hemispheres predominantly in watershed and posterior vascular territories.   She remained poorly responsive and was transferred to [facility] to rule out non convulsive status epilepticus. (She had been intubated and admitted to ICU). EEG showed that she was not having active seizures. She was extubated and downgraded 2/18/24. She remains minimally responsive. As per Living Will, patient does not want CPR/Intubation or feeding tube, antibiotics  or any heroic measures in the event she has no hope for meaningful recovery. All 3 children agree to remove nasogastric tube and focus on patient's comfort and manage symptoms. Agree to Hospice consult and Palliative Care. No escalation of care.

## 2024-02-21 PROCEDURE — 99232 SBSQ HOSP IP/OBS MODERATE 35: CPT

## 2024-02-21 RX ORDER — ACETAMINOPHEN 500 MG
600 TABLET ORAL ONCE
Refills: 0 | Status: DISCONTINUED | OUTPATIENT
Start: 2024-02-21 | End: 2024-02-22

## 2024-02-21 RX ORDER — LANOLIN ALCOHOL/MO/W.PET/CERES
3 CREAM (GRAM) TOPICAL ONCE
Refills: 0 | Status: COMPLETED | OUTPATIENT
Start: 2024-02-21 | End: 2024-02-21

## 2024-02-21 RX ADMIN — MORPHINE SULFATE 2 MILLIGRAM(S): 50 CAPSULE, EXTENDED RELEASE ORAL at 14:41

## 2024-02-21 RX ADMIN — Medication 3 MILLIGRAM(S): at 01:30

## 2024-02-21 RX ADMIN — MORPHINE SULFATE 2 MILLIGRAM(S): 50 CAPSULE, EXTENDED RELEASE ORAL at 15:00

## 2024-02-21 RX ADMIN — PANTOPRAZOLE SODIUM 40 MILLIGRAM(S): 20 TABLET, DELAYED RELEASE ORAL at 18:00

## 2024-02-21 RX ADMIN — MORPHINE SULFATE 2 MILLIGRAM(S): 50 CAPSULE, EXTENDED RELEASE ORAL at 21:13

## 2024-02-21 RX ADMIN — MORPHINE SULFATE 2 MILLIGRAM(S): 50 CAPSULE, EXTENDED RELEASE ORAL at 20:43

## 2024-02-21 NOTE — PROGRESS NOTE ADULT - ASSESSMENT
78 year old female, functional and independent at baseline, with a history of a CVA, 7 years ago, left-sided craniotomy for an abscess 3 years ago, seizures (on Keppra since ), presents to [facility] as a transfer from [facility] [place], where she presented after being found on the floor, noted to have a R IT hip fracture, which was repaired, and was subsequently noted w/extensive small infarcts in watershed and posterior vascular territory distribution on an MRI of the brain, for close neuro monitoring and to r/o seizure.  Focus of care now oriented on comfort; hospice consulted    # CVA, seizures  - imaging report reviewed  - no seizure activity on vEEG  - on Keppra   - focus of care now on comfort      # dysphagia:  - in the setting of CVA  - pleasure feeds  - agree with glycopyrrolate prn    # s/p right hip fixation:  - agree w/morphine prn    # constipation:  - agree with bisacodyl MT prn    # anxiety  - agree with lorazepam prn    # debility:   - supportive care, assist as needed    # palliative care encounter:  - HCP documentation shown: primary agent: spouse, Jason Wise (), alt: Geoff Wise  - DNR/I orders  - GOC note reviewed and appreciated.  Hospice consulted.     78 year old female, functional and independent at baseline, with a history of a CVA, 7 years ago, left-sided craniotomy for an abscess 3 years ago, seizures (on Keppra since ), presents to [facility] [place] a [place] [facility] where she presented after being found on the floor, noted to have a R IT hip fracture, which was repaired, and was subsequently noted w/extensive small infarcts in watershed and posterior vascular territory distribution on an MRI of the brain, for close neuro monitoring and to r/o seizure.  Focus of care now oriented on comfort; hospice consulted    # CVA, seizures  - imaging report reviewed  - no seizure activity on vEEG  - on Keppra   - focus of care now on comfort      # dysphagia:  - in the setting of CVA  - pleasure feeds  - agree with glycopyrrolate prn    # s/p right hip fixation:  - agree w/morphine prn    # constipation:  - agree with bisacodyl OR prn    # anxiety  - agree with lorazepam prn    # debility:   - supportive care, assist as needed    # palliative care encounter:  - HCP documentation shown: primary agent: spouse, Jason Wise (), alt: Geoff Wise  - DNR/I orders  - C note reviewed and appreciated.  Hospice consulted.      Patient reviewed with primary team today.  As patient with low sx burden, goals of care clarified, and hospice consulted, will sign off.  Thank you involving us in the care of this patient.  Please reconsult as needed.    Total time spent: Total Time Spent__35__ minutes  This includes chart review, patient assessment, discussion and collaboration with interdisciplinary team members, education of family at bedside.

## 2024-02-21 NOTE — PROGRESS NOTE ADULT - ASSESSMENT
78y/oF PMH CVA (7yrs ago), L-sided craniotomy for abscess 3 yrs ago, hx seizures (on Keppra since 2021) presenting to [facility] ER 2/13 after found down for ~10hrs. Pt with reported confusion, impaired speech and hypoxia to spO2 72. CTH and CTA head, neck, perfusion without acute findings. Found to have R IT hip fx, repaired 2/14. on 2/15, pt found to be unresponsive with intermittent hypoxia. MRI brain with extensive small infarcts in watershed and posterior vascular territory distribution. Seen by Palliative care, initially transitioned to comfort care and DNR/DNI code status. After review of the care by Dr. Gaitan and Dr. Shipley, it was felt that the infarcts alone were not extensive enough to cause the depressed level of consciousness and that non-convulsive status should be ruled out. Family revoked comfort care status, pt upgraded back to ICU after receiving dose Keppra and Versed. Pt intubated for airway protection. Transferred to [facility] 2/17 under NSICU care for q1h neurochecks and r/o seizure. Pt continued on Keppra 1000mg BID. cvEEG without seizures. Extubated 2/18 to NC. Mental status/neuro exam improving. Downgraded to floor 2/18. Pt now transitioned to comfort care measures 2/20      metabolic encephalopathy 2/2   acute CVAs   status epilepticus ruled out   hx seizure disorder   rule out infection  -s/p NSICU   -CTH, CTA head/neck reviewed (HIE)   -MRI brain 2/15 reviewed (HIE): numerous areas acute infarcts seen throughout posterior fossa and supratentorial regions   -cvEEG without seizures   -cont asa, statin   -extubated 2/18 (intubated prior to transfer for airway protection)   -fall precautions  -seizure precautions   -aspiration precautions   -cont neurochecks   -neuro recs appreciated  -cont Keppra  -cultures ngtd    R hip fx   -s/p R hip fixation (2/14) at [facility]   -as per ortho note WBAT     Hypokalemia   -repleted     Anemia  suspect some postop blood loss   -monitor for s/sx active bleeding       MOLST and living will in chart , reviewed     Palliative following       pt now comfort care measures; see GOC note 2/20   pending inpt hospice

## 2024-02-21 NOTE — PROGRESS NOTE ADULT - SUBJECTIVE AND OBJECTIVE BOX
LULY LUZ    [id number]    78y      Female    CC: ams    INTERVAL HPI/OVERNIGHT EVENTS: Pt seen and examined. daughter at bedside     REVIEW OF SYSTEMS:  unable to obtain     Vital Signs Last 24 Hrs  T(C): --  T(F): --  HR: --  BP: --  BP(mean): --  RR: --  SpO2: --        PHYSICAL EXAM:    GENERAL: NAD  HEENT: PERRL, +EOMI  NECK: soft, supple  CHEST/LUNG: Clear to auscultation bilaterally; No wheezing  HEART: S1S2+, Regular rate and rhythm; No murmurs, rubs, or gallops  ABDOMEN: Soft, Nontender, Nondistended; Bowel sounds present  SKIN: No rashes or lesions  NEURO: AAOX3, no focal deficits, no motor or sensory loss  PSYCH: normal mood    LABS:                        8.9    9.43  )-----------( 239      ( 20 Feb 2024 06:41 )             25.9     02-20    142  |  105  |  20.9<H>  ----------------------------<  217<H>  3.7   |  28.0  |  0.49<L>    Ca    8.0<L>      20 Feb 2024 06:41  Mg     2.1     02-20        Urinalysis Basic - ( 20 Feb 2024 06:41 )    Color: x / Appearance: x / SG: x / pH: x  Gluc: 217 mg/dL / Ketone: x  / Bili: x / Urobili: x   Blood: x / Protein: x / Nitrite: x   Leuk Esterase: x / RBC: x / WBC x   Sq Epi: x / Non Sq Epi: x / Bacteria: x          MEDICATIONS  (STANDING):  acetaminophen   IVPB .. 600 milliGRAM(s) IV Intermittent once  levETIRAcetam   Injectable 1000 milliGRAM(s) IV Push every 12 hours  pantoprazole  Injectable 40 milliGRAM(s) IV Push daily    MEDICATIONS  (PRN):  artificial  tears Solution 2 Drop(s) Both EYES every 1 hour PRN Dry Eyes  bisacodyl Suppository 10 milliGRAM(s) Rectal daily PRN Constipation  glycopyrrolate Injectable 0.4 milliGRAM(s) IV Push four times a day PRN Secretions  LORazepam   Injectable 0.5 milliGRAM(s) IV Push every 4 hours PRN Anxiety  morphine  - Injectable 2 milliGRAM(s) IV Push every 2 hours PRN Moderate pain (4-6), Severe pain (7-10), Respiratory rate greater than 22      RADIOLOGY & ADDITIONAL TESTS:   LULY LUZ    [id number]    78y      Female    CC: ams    INTERVAL HPI/OVERNIGHT EVENTS: Pt seen and examined. daughter at bedside     REVIEW OF SYSTEMS:  unable to obtain     Vital Signs Last 24 Hrs  T(C): --  T(F): --  HR: --  BP: --  BP(mean): --  RR: --  SpO2: --        PHYSICAL EXAM:    GENERAL: NAD  CHEST/LUNG: respirations unlabored   HEART: S1S2+, Regular rate and rhythm  ABDOMEN: Soft, Nontender, Nondistended  SKIN: warm, dry   NEURO: drowsy    LABS:                        8.9    9.43  )-----------( 239      ( 20 Feb 2024 06:41 )             25.9     02-20    142  |  105  |  20.9<H>  ----------------------------<  217<H>  3.7   |  28.0  |  0.49<L>    Ca    8.0<L>      20 Feb 2024 06:41  Mg     2.1     02-20        Urinalysis Basic - ( 20 Feb 2024 06:41 )    Color: x / Appearance: x / SG: x / pH: x  Gluc: 217 mg/dL / Ketone: x  / Bili: x / Urobili: x   Blood: x / Protein: x / Nitrite: x   Leuk Esterase: x / RBC: x / WBC x   Sq Epi: x / Non Sq Epi: x / Bacteria: x          MEDICATIONS  (STANDING):  acetaminophen   IVPB .. 600 milliGRAM(s) IV Intermittent once  levETIRAcetam   Injectable 1000 milliGRAM(s) IV Push every 12 hours  pantoprazole  Injectable 40 milliGRAM(s) IV Push daily    MEDICATIONS  (PRN):  artificial  tears Solution 2 Drop(s) Both EYES every 1 hour PRN Dry Eyes  bisacodyl Suppository 10 milliGRAM(s) Rectal daily PRN Constipation  glycopyrrolate Injectable 0.4 milliGRAM(s) IV Push four times a day PRN Secretions  LORazepam   Injectable 0.5 milliGRAM(s) IV Push every 4 hours PRN Anxiety  morphine  - Injectable 2 milliGRAM(s) IV Push every 2 hours PRN Moderate pain (4-6), Severe pain (7-10), Respiratory rate greater than 22      RADIOLOGY & ADDITIONAL TESTS:

## 2024-02-21 NOTE — PROGRESS NOTE ADULT - SUBJECTIVE AND OBJECTIVE BOX
Palliative care follow up:    OVERNIGHT EVENTS:     Yesterday, primary team spoke with patient's with patient's children.  Mindful of patient's living will, overall condition, NGT removed, with comfort measures only: DNR/I, no blood draws, no artificial nutrition, no antibiotics, no IVF.  Hospice consulted.      Patient seen and examined at the bedside at 10:10 am.  Unable to obtain HPI, ROS from patient.  Patient resting at the time of visit, did not respond to verbal stim.      Patient's daughter at bedside, shares that she spent the night here, and that her mother reported that she was hungry this morning.  Reviewed pleasure feeds/taste with known risk of aspiration. At daughter's request, reviewed distinction between hospice and palliative care.  Explained that care being provided at this time is consistent w/known wishes, which daughter acknowledges.  Difficulty of the situation acknowledged.  Support provided.    Present Symptoms: unable to obtain    Review of Systems: Unable to obtain due to poor mentation     MEDICATIONS  (STANDING):  acetaminophen   IVPB .. 600 milliGRAM(s) IV Intermittent once  levETIRAcetam   Injectable 1000 milliGRAM(s) IV Push every 12 hours  pantoprazole  Injectable 40 milliGRAM(s) IV Push daily    MEDICATIONS  (PRN):  artificial  tears Solution 2 Drop(s) Both EYES every 1 hour PRN Dry Eyes  bisacodyl Suppository 10 milliGRAM(s) Rectal daily PRN Constipation  glycopyrrolate Injectable 0.4 milliGRAM(s) IV Push four times a day PRN Secretions  LORazepam   Injectable 0.5 milliGRAM(s) IV Push every 4 hours PRN Anxiety  morphine  - Injectable 2 milliGRAM(s) IV Push every 2 hours PRN Moderate pain (4-6), Severe pain (7-10), Respiratory rate greater than 22      PHYSICAL EXAM:      Vital Signs Last 24 Hrs  T(C): --  T(F): --  HR: --  BP: --  BP(mean): --  RR: --  SpO2: --    Karnofsky: 30  %    Gen: In NAD.  No pain behaviors or work of breathing noted  Neuro: resting, not responding to verbal stim  Head: NC/AT  Eyes: closed  ENT: no lip ulcerations  Resp: unlabored  CV: S1, S2  : external urine collection system without gross hematuria  Peripheral Vasc: warm  Int: warm and dry  Psych: no psychomotor agitation    LABS:                          8.9    9.43  )-----------( 239      ( 2024 06:41 )             25.9     02-20    142  |  105  |  20.9<H>  ----------------------------<  217<H>  3.7   |  28.0  |  0.49<L>    Ca    8.0<L>      2024 06:41  Mg     2.1             Urinalysis Basic - ( 2024 06:41 )    Color: x / Appearance: x / SG: x / pH: x  Gluc: 217 mg/dL / Ketone: x  / Bili: x / Urobili: x   Blood: x / Protein: x / Nitrite: x   Leuk Esterase: x / RBC: x / WBC x   Sq Epi: x / Non Sq Epi: x / Bacteria: x      I&O's Summary    2024 07:01  -  2024 07:00  --------------------------------------------------------  IN: 0 mL / OUT: 300 mL / NET: -300 mL        RADIOLOGY & ADDITIONAL STUDIES:    ADVANCE DIRECTIVES:  Son, Geoff Yanes, noted as HCP (spouse, who was primary agent, is )   DNR/I, comfort measures  Hospice consulted

## 2024-02-22 ENCOUNTER — TRANSCRIPTION ENCOUNTER (OUTPATIENT)
Age: 79
End: 2024-02-22

## 2024-02-22 PROCEDURE — 95700 EEG CONT REC W/VID EEG TECH: CPT

## 2024-02-22 PROCEDURE — 71045 X-RAY EXAM CHEST 1 VIEW: CPT

## 2024-02-22 PROCEDURE — 87040 BLOOD CULTURE FOR BACTERIA: CPT

## 2024-02-22 PROCEDURE — 84100 ASSAY OF PHOSPHORUS: CPT

## 2024-02-22 PROCEDURE — 83735 ASSAY OF MAGNESIUM: CPT

## 2024-02-22 PROCEDURE — 86901 BLOOD TYPING SEROLOGIC RH(D): CPT

## 2024-02-22 PROCEDURE — 85025 COMPLETE CBC W/AUTO DIFF WBC: CPT

## 2024-02-22 PROCEDURE — 82607 VITAMIN B-12: CPT

## 2024-02-22 PROCEDURE — 36415 COLL VENOUS BLD VENIPUNCTURE: CPT

## 2024-02-22 PROCEDURE — 84443 ASSAY THYROID STIM HORMONE: CPT

## 2024-02-22 PROCEDURE — 81001 URINALYSIS AUTO W/SCOPE: CPT

## 2024-02-22 PROCEDURE — 80048 BASIC METABOLIC PNL TOTAL CA: CPT

## 2024-02-22 PROCEDURE — 85045 AUTOMATED RETICULOCYTE COUNT: CPT

## 2024-02-22 PROCEDURE — 86900 BLOOD TYPING SEROLOGIC ABO: CPT

## 2024-02-22 PROCEDURE — 83550 IRON BINDING TEST: CPT

## 2024-02-22 PROCEDURE — 80076 HEPATIC FUNCTION PANEL: CPT

## 2024-02-22 PROCEDURE — 82728 ASSAY OF FERRITIN: CPT

## 2024-02-22 PROCEDURE — 84466 ASSAY OF TRANSFERRIN: CPT

## 2024-02-22 PROCEDURE — 87640 STAPH A DNA AMP PROBE: CPT

## 2024-02-22 PROCEDURE — 94002 VENT MGMT INPAT INIT DAY: CPT

## 2024-02-22 PROCEDURE — 95714 VEEG EA 12-26 HR UNMNTR: CPT

## 2024-02-22 PROCEDURE — 93970 EXTREMITY STUDY: CPT

## 2024-02-22 PROCEDURE — 83036 HEMOGLOBIN GLYCOSYLATED A1C: CPT

## 2024-02-22 PROCEDURE — 85610 PROTHROMBIN TIME: CPT

## 2024-02-22 PROCEDURE — 80061 LIPID PANEL: CPT

## 2024-02-22 PROCEDURE — 99239 HOSP IP/OBS DSCHRG MGMT >30: CPT

## 2024-02-22 PROCEDURE — 87641 MR-STAPH DNA AMP PROBE: CPT

## 2024-02-22 PROCEDURE — 94003 VENT MGMT INPAT SUBQ DAY: CPT

## 2024-02-22 PROCEDURE — 82746 ASSAY OF FOLIC ACID SERUM: CPT

## 2024-02-22 PROCEDURE — 83540 ASSAY OF IRON: CPT

## 2024-02-22 PROCEDURE — 92610 EVALUATE SWALLOWING FUNCTION: CPT

## 2024-02-22 PROCEDURE — 86850 RBC ANTIBODY SCREEN: CPT

## 2024-02-22 PROCEDURE — 95813 EEG EXTND MNTR 61-119 MIN: CPT

## 2024-02-22 PROCEDURE — 87086 URINE CULTURE/COLONY COUNT: CPT

## 2024-02-22 PROCEDURE — 85027 COMPLETE CBC AUTOMATED: CPT

## 2024-02-22 RX ORDER — MORPHINE SULFATE 50 MG/1
1 CAPSULE, EXTENDED RELEASE ORAL ONCE
Refills: 0 | Status: DISCONTINUED | OUTPATIENT
Start: 2024-02-22 | End: 2024-02-22

## 2024-02-22 RX ORDER — ACETAMINOPHEN 500 MG
60 TABLET ORAL
Qty: 0 | Refills: 0 | DISCHARGE
Start: 2024-02-22

## 2024-02-22 RX ADMIN — PANTOPRAZOLE SODIUM 40 MILLIGRAM(S): 20 TABLET, DELAYED RELEASE ORAL at 13:26

## 2024-02-22 RX ADMIN — MORPHINE SULFATE 2 MILLIGRAM(S): 50 CAPSULE, EXTENDED RELEASE ORAL at 11:01

## 2024-02-22 RX ADMIN — MORPHINE SULFATE 2 MILLIGRAM(S): 50 CAPSULE, EXTENDED RELEASE ORAL at 11:06

## 2024-02-22 RX ADMIN — MORPHINE SULFATE 1 MILLIGRAM(S): 50 CAPSULE, EXTENDED RELEASE ORAL at 13:05

## 2024-02-22 RX ADMIN — MORPHINE SULFATE 1 MILLIGRAM(S): 50 CAPSULE, EXTENDED RELEASE ORAL at 13:26

## 2024-02-22 NOTE — DISCHARGE NOTE NURSING/CASE MANAGEMENT/SOCIAL WORK - PATIENT PORTAL LINK FT
You can access the FollowMyHealth Patient Portal offered by [facility] by registering at the following website: [email or web address]. By joining [facility]’s FollowMyHealth portal, you will also be able to view your health information using other applications (apps) compatible with our system.

## 2024-02-23 LAB
CULTURE RESULTS: SIGNIFICANT CHANGE UP
CULTURE RESULTS: SIGNIFICANT CHANGE UP
SPECIMEN SOURCE: SIGNIFICANT CHANGE UP
SPECIMEN SOURCE: SIGNIFICANT CHANGE UP
